# Patient Record
Sex: MALE | Race: WHITE | NOT HISPANIC OR LATINO | URBAN - METROPOLITAN AREA
[De-identification: names, ages, dates, MRNs, and addresses within clinical notes are randomized per-mention and may not be internally consistent; named-entity substitution may affect disease eponyms.]

---

## 2017-02-17 PROBLEM — Z00.00 ENCOUNTER FOR PREVENTIVE HEALTH EXAMINATION: Status: ACTIVE | Noted: 2017-02-17

## 2017-02-23 ENCOUNTER — OUTPATIENT (OUTPATIENT)
Dept: OUTPATIENT SERVICES | Facility: HOSPITAL | Age: 72
LOS: 1 days | End: 2017-02-23
Payer: MEDICARE

## 2017-02-23 ENCOUNTER — APPOINTMENT (OUTPATIENT)
Dept: MRI IMAGING | Facility: CLINIC | Age: 72
End: 2017-02-23

## 2017-02-23 DIAGNOSIS — Z00.8 ENCOUNTER FOR OTHER GENERAL EXAMINATION: ICD-10-CM

## 2017-02-23 PROCEDURE — A9585: CPT

## 2017-02-23 PROCEDURE — 70553 MRI BRAIN STEM W/O & W/DYE: CPT

## 2021-01-06 ENCOUNTER — APPOINTMENT (OUTPATIENT)
Dept: COLORECTAL SURGERY | Facility: CLINIC | Age: 76
End: 2021-01-06
Payer: MEDICARE

## 2021-01-06 ENCOUNTER — LABORATORY RESULT (OUTPATIENT)
Age: 76
End: 2021-01-06

## 2021-01-06 VITALS
SYSTOLIC BLOOD PRESSURE: 149 MMHG | DIASTOLIC BLOOD PRESSURE: 84 MMHG | HEART RATE: 56 BPM | WEIGHT: 160 LBS | TEMPERATURE: 97 F | RESPIRATION RATE: 16 BRPM | HEIGHT: 68 IN | BODY MASS INDEX: 24.25 KG/M2

## 2021-01-06 DIAGNOSIS — Z78.9 OTHER SPECIFIED HEALTH STATUS: ICD-10-CM

## 2021-01-06 PROCEDURE — 99203 OFFICE O/P NEW LOW 30 MIN: CPT

## 2021-01-06 PROCEDURE — 10081 I&D PILONIDAL CYST COMP: CPT

## 2021-01-15 PROBLEM — Z78.9 NON-SMOKER: Status: ACTIVE | Noted: 2021-01-15

## 2021-01-15 NOTE — HISTORY OF PRESENT ILLNESS
[FreeTextEntry1] : Consultation requested by Dr. Brito for pilonidal abscess. 75-year-old male has history of pilonidal cyst and abscess he is with complaints of recent abscess with pain, symptoms worsening

## 2021-01-15 NOTE — CONSULT LETTER
[Dear  ___] : Dear  [unfilled], [Consult Letter:] : I had the pleasure of evaluating your patient, [unfilled]. [Please see my note below.] : Please see my note below. [Consult Closing:] : Thank you very much for allowing me to participate in the care of this patient.  If you have any questions, please do not hesitate to contact me. [Sincerely,] : Sincerely, [FreeTextEntry3] : Clinton Do M.D. FACS, FASCRS

## 2021-01-15 NOTE — ASSESSMENT
[FreeTextEntry1] : 75-year-old male with complicated pilonidal abscess. Recommend incision and drainage oral antibiotics wound care.

## 2021-01-15 NOTE — PROCEDURE
[FreeTextEntry1] : After informed consent was obtained area was prepped and draped, incision and drainage Of pilonidal abscess was performed of abscess cultures were obtained, sterile dressings were applied the patient procedure well.

## 2021-01-15 NOTE — PHYSICAL EXAM
[Normal rectal exam] : exam was normal [None] : no anal fissures seen [Pilonidal Cyst] : a pilonidal cyst [Pilonidal Sinus] : a pilonidal sinus [Normal Breath Sounds] : Normal breath sounds [Normal Heart Sounds] : normal heart sounds [Normal Rate and Rhythm] : normal rate and rhythm [No Rash or Lesion] : No rash or lesion [Alert] : alert [Oriented to Person] : oriented to person [Oriented to Place] : oriented to place [Oriented to Time] : oriented to time [Calm] : calm [Abdomen Masses] : No abdominal masses [Tender] : nontender [JVD] : no jugular venous distention  [de-identified] : Large pilonidal abscess [de-identified] : Looks well in no distress, of stated age. [de-identified] : pupils equal reactive to light normocephalic atraumatic. [de-identified] : moves all 4 extremities appropriately with 5 over 5 strength

## 2021-01-19 ENCOUNTER — APPOINTMENT (OUTPATIENT)
Dept: COLORECTAL SURGERY | Facility: CLINIC | Age: 76
End: 2021-01-19
Payer: MEDICARE

## 2021-01-19 VITALS — WEIGHT: 160 LBS | BODY MASS INDEX: 24.25 KG/M2 | HEIGHT: 68 IN | RESPIRATION RATE: 16 BRPM | TEMPERATURE: 97.2 F

## 2021-01-19 DIAGNOSIS — Z78.9 OTHER SPECIFIED HEALTH STATUS: ICD-10-CM

## 2021-01-19 DIAGNOSIS — Z85.038 PERSONAL HISTORY OF OTHER MALIGNANT NEOPLASM OF LARGE INTESTINE: ICD-10-CM

## 2021-01-19 DIAGNOSIS — L05.01 PILONIDAL CYST WITH ABSCESS: ICD-10-CM

## 2021-01-19 DIAGNOSIS — Z86.39 PERSONAL HISTORY OF OTHER ENDOCRINE, NUTRITIONAL AND METABOLIC DISEASE: ICD-10-CM

## 2021-01-19 PROCEDURE — 99024 POSTOP FOLLOW-UP VISIT: CPT

## 2021-01-19 RX ORDER — DOXYCYCLINE 100 MG/1
100 TABLET, FILM COATED ORAL
Qty: 20 | Refills: 0 | Status: ACTIVE | COMMUNITY
Start: 2020-12-22

## 2021-01-19 RX ORDER — DONEPEZIL HYDROCHLORIDE 10 MG/1
10 TABLET ORAL
Qty: 30 | Refills: 0 | Status: ACTIVE | COMMUNITY
Start: 2020-11-23

## 2021-01-19 RX ORDER — EZETIMIBE 10 MG/1
10 TABLET ORAL
Qty: 30 | Refills: 0 | Status: ACTIVE | COMMUNITY
Start: 2020-12-08

## 2021-01-19 RX ORDER — METFORMIN ER 500 MG 500 MG/1
500 TABLET ORAL
Qty: 30 | Refills: 0 | Status: ACTIVE | COMMUNITY
Start: 2020-10-29

## 2021-01-19 RX ORDER — TAMSULOSIN HYDROCHLORIDE 0.4 MG/1
0.4 CAPSULE ORAL
Qty: 60 | Refills: 0 | Status: ACTIVE | COMMUNITY
Start: 2021-01-08

## 2021-01-19 RX ORDER — OMEPRAZOLE 20 MG/1
20 CAPSULE, DELAYED RELEASE ORAL
Qty: 60 | Refills: 0 | Status: ACTIVE | COMMUNITY
Start: 2020-12-30

## 2021-01-19 RX ORDER — METOPROLOL TARTRATE 50 MG/1
50 TABLET, FILM COATED ORAL
Qty: 60 | Refills: 0 | Status: ACTIVE | COMMUNITY
Start: 2020-04-15

## 2021-01-19 RX ORDER — ALPRAZOLAM 0.25 MG/1
0.25 TABLET ORAL
Qty: 60 | Refills: 0 | Status: ACTIVE | COMMUNITY
Start: 2021-01-06

## 2021-01-19 RX ORDER — RAMIPRIL 10 MG/1
10 CAPSULE ORAL
Qty: 30 | Refills: 0 | Status: ACTIVE | COMMUNITY
Start: 2020-04-15

## 2021-01-19 RX ORDER — HYDROCODONE BITARTRATE AND ACETAMINOPHEN 7.5; 325 MG/1; MG/1
7.5-325 TABLET ORAL
Qty: 45 | Refills: 0 | Status: ACTIVE | COMMUNITY
Start: 2020-12-29

## 2021-01-19 RX ORDER — MEMANTINE HYDROCHLORIDE 5 MG/1
5 TABLET, FILM COATED ORAL
Qty: 60 | Refills: 0 | Status: ACTIVE | COMMUNITY
Start: 2020-12-01

## 2021-01-19 RX ORDER — MIRTAZAPINE 15 MG/1
15 TABLET, FILM COATED ORAL
Qty: 15 | Refills: 0 | Status: ACTIVE | COMMUNITY
Start: 2020-12-22

## 2021-01-19 RX ORDER — CELECOXIB 200 MG/1
200 CAPSULE ORAL
Qty: 30 | Refills: 0 | Status: ACTIVE | COMMUNITY
Start: 2020-11-27

## 2021-01-19 RX ORDER — ATORVASTATIN CALCIUM 80 MG/1
80 TABLET, FILM COATED ORAL
Qty: 30 | Refills: 0 | Status: ACTIVE | COMMUNITY
Start: 2020-12-08

## 2021-01-19 RX ORDER — DUTASTERIDE 0.5 MG/1
0.5 CAPSULE, LIQUID FILLED ORAL
Qty: 30 | Refills: 0 | Status: ACTIVE | COMMUNITY
Start: 2021-01-08

## 2021-01-19 RX ORDER — DIVALPROEX SODIUM 250 MG/1
250 TABLET, EXTENDED RELEASE ORAL
Qty: 30 | Refills: 0 | Status: ACTIVE | COMMUNITY
Start: 2020-11-17

## 2021-01-19 NOTE — PHYSICAL EXAM
[Multiple Sinus Tracts] : no perianal sinus tracts [Pilonidal Cyst] : no pilonidal cysts [Pilonidal Sinus] : no pilonidal sinus [de-identified] : pilonidal abscess has resolved completely [de-identified] : Looks well in no distress, of stated age.

## 2021-02-15 ENCOUNTER — INPATIENT (INPATIENT)
Facility: HOSPITAL | Age: 76
LOS: 4 days | Discharge: HOME | DRG: 690 | End: 2021-02-20
Attending: HOSPITALIST | Admitting: STUDENT IN AN ORGANIZED HEALTH CARE EDUCATION/TRAINING PROGRAM
Payer: MEDICARE

## 2021-02-15 VITALS
HEART RATE: 131 BPM | TEMPERATURE: 98 F | RESPIRATION RATE: 18 BRPM | SYSTOLIC BLOOD PRESSURE: 179 MMHG | OXYGEN SATURATION: 97 % | DIASTOLIC BLOOD PRESSURE: 93 MMHG

## 2021-02-15 DIAGNOSIS — F02.81 DEMENTIA IN OTHER DISEASES CLASSIFIED ELSEWHERE, UNSPECIFIED SEVERITY, WITH BEHAVIORAL DISTURBANCE: ICD-10-CM

## 2021-02-15 LAB
ALBUMIN SERPL ELPH-MCNC: 3.7 G/DL — SIGNIFICANT CHANGE UP (ref 3.3–5.2)
ALP SERPL-CCNC: 88 U/L — SIGNIFICANT CHANGE UP (ref 40–120)
ALT FLD-CCNC: 65 U/L — HIGH
ANION GAP SERPL CALC-SCNC: 15 MMOL/L — SIGNIFICANT CHANGE UP (ref 5–17)
APPEARANCE UR: ABNORMAL
APTT BLD: 25.6 SEC — LOW (ref 27.5–35.5)
AST SERPL-CCNC: 41 U/L — HIGH
BACTERIA # UR AUTO: ABNORMAL
BASOPHILS # BLD AUTO: 0.03 K/UL — SIGNIFICANT CHANGE UP (ref 0–0.2)
BASOPHILS NFR BLD AUTO: 0.3 % — SIGNIFICANT CHANGE UP (ref 0–2)
BILIRUB SERPL-MCNC: 0.6 MG/DL — SIGNIFICANT CHANGE UP (ref 0.4–2)
BILIRUB UR-MCNC: NEGATIVE — SIGNIFICANT CHANGE UP
BUN SERPL-MCNC: 27 MG/DL — HIGH (ref 8–20)
CALCIUM SERPL-MCNC: 9.6 MG/DL — SIGNIFICANT CHANGE UP (ref 8.6–10.2)
CHLORIDE SERPL-SCNC: 104 MMOL/L — SIGNIFICANT CHANGE UP (ref 98–107)
CO2 SERPL-SCNC: 21 MMOL/L — LOW (ref 22–29)
COLOR SPEC: YELLOW — SIGNIFICANT CHANGE UP
CREAT SERPL-MCNC: 0.95 MG/DL — SIGNIFICANT CHANGE UP (ref 0.5–1.3)
DIFF PNL FLD: ABNORMAL
EOSINOPHIL # BLD AUTO: 0.08 K/UL — SIGNIFICANT CHANGE UP (ref 0–0.5)
EOSINOPHIL NFR BLD AUTO: 0.7 % — SIGNIFICANT CHANGE UP (ref 0–6)
EPI CELLS # UR: SIGNIFICANT CHANGE UP
GLUCOSE SERPL-MCNC: 118 MG/DL — HIGH (ref 70–99)
GLUCOSE UR QL: NEGATIVE MG/DL — SIGNIFICANT CHANGE UP
HCT VFR BLD CALC: 44 % — SIGNIFICANT CHANGE UP (ref 39–50)
HGB BLD-MCNC: 14.8 G/DL — SIGNIFICANT CHANGE UP (ref 13–17)
IMM GRANULOCYTES NFR BLD AUTO: 0.8 % — SIGNIFICANT CHANGE UP (ref 0–1.5)
INR BLD: 0.99 RATIO — SIGNIFICANT CHANGE UP (ref 0.88–1.16)
KETONES UR-MCNC: ABNORMAL
LACTATE BLDV-MCNC: 2.6 MMOL/L — HIGH (ref 0.5–2)
LEUKOCYTE ESTERASE UR-ACNC: ABNORMAL
LIDOCAIN IGE QN: 105 U/L — HIGH (ref 22–51)
LYMPHOCYTES # BLD AUTO: 25.5 % — SIGNIFICANT CHANGE UP (ref 13–44)
LYMPHOCYTES # BLD AUTO: 3.03 K/UL — SIGNIFICANT CHANGE UP (ref 1–3.3)
MCHC RBC-ENTMCNC: 29.6 PG — SIGNIFICANT CHANGE UP (ref 27–34)
MCHC RBC-ENTMCNC: 33.6 GM/DL — SIGNIFICANT CHANGE UP (ref 32–36)
MCV RBC AUTO: 88 FL — SIGNIFICANT CHANGE UP (ref 80–100)
MONOCYTES # BLD AUTO: 1.02 K/UL — HIGH (ref 0–0.9)
MONOCYTES NFR BLD AUTO: 8.6 % — SIGNIFICANT CHANGE UP (ref 2–14)
NEUTROPHILS # BLD AUTO: 7.62 K/UL — HIGH (ref 1.8–7.4)
NEUTROPHILS NFR BLD AUTO: 64.1 % — SIGNIFICANT CHANGE UP (ref 43–77)
NITRITE UR-MCNC: NEGATIVE — SIGNIFICANT CHANGE UP
PH UR: 6.5 — SIGNIFICANT CHANGE UP (ref 5–8)
PLATELET # BLD AUTO: 186 K/UL — SIGNIFICANT CHANGE UP (ref 150–400)
POTASSIUM SERPL-MCNC: 4.4 MMOL/L — SIGNIFICANT CHANGE UP (ref 3.5–5.3)
POTASSIUM SERPL-SCNC: 4.4 MMOL/L — SIGNIFICANT CHANGE UP (ref 3.5–5.3)
PROT SERPL-MCNC: 6.7 G/DL — SIGNIFICANT CHANGE UP (ref 6.6–8.7)
PROT UR-MCNC: 15 MG/DL
PROTHROM AB SERPL-ACNC: 11.5 SEC — SIGNIFICANT CHANGE UP (ref 10.6–13.6)
RBC # BLD: 5 M/UL — SIGNIFICANT CHANGE UP (ref 4.2–5.8)
RBC # FLD: 13.5 % — SIGNIFICANT CHANGE UP (ref 10.3–14.5)
RBC CASTS # UR COMP ASSIST: ABNORMAL /HPF (ref 0–4)
SARS-COV-2 RNA SPEC QL NAA+PROBE: SIGNIFICANT CHANGE UP
SODIUM SERPL-SCNC: 140 MMOL/L — SIGNIFICANT CHANGE UP (ref 135–145)
SP GR SPEC: 1.01 — SIGNIFICANT CHANGE UP (ref 1.01–1.02)
UROBILINOGEN FLD QL: NEGATIVE MG/DL — SIGNIFICANT CHANGE UP
VALPROATE SERPL-MCNC: 10.4 UG/ML — LOW (ref 50–100)
WBC # BLD: 11.88 K/UL — HIGH (ref 3.8–10.5)
WBC # FLD AUTO: 11.88 K/UL — HIGH (ref 3.8–10.5)
WBC UR QL: SIGNIFICANT CHANGE UP

## 2021-02-15 PROCEDURE — 93010 ELECTROCARDIOGRAM REPORT: CPT

## 2021-02-15 PROCEDURE — 74176 CT ABD & PELVIS W/O CONTRAST: CPT | Mod: 26

## 2021-02-15 PROCEDURE — 73502 X-RAY EXAM HIP UNI 2-3 VIEWS: CPT | Mod: 26,LT

## 2021-02-15 PROCEDURE — 71045 X-RAY EXAM CHEST 1 VIEW: CPT | Mod: 26

## 2021-02-15 RX ORDER — SODIUM CHLORIDE 9 MG/ML
1500 INJECTION INTRAMUSCULAR; INTRAVENOUS; SUBCUTANEOUS ONCE
Refills: 0 | Status: COMPLETED | OUTPATIENT
Start: 2021-02-15 | End: 2021-02-15

## 2021-02-15 RX ORDER — ATORVASTATIN CALCIUM 80 MG/1
80 TABLET, FILM COATED ORAL AT BEDTIME
Refills: 0 | Status: DISCONTINUED | OUTPATIENT
Start: 2021-02-15 | End: 2021-02-20

## 2021-02-15 RX ORDER — PANTOPRAZOLE SODIUM 20 MG/1
40 TABLET, DELAYED RELEASE ORAL
Refills: 0 | Status: DISCONTINUED | OUTPATIENT
Start: 2021-02-15 | End: 2021-02-20

## 2021-02-15 RX ORDER — CEFPODOXIME PROXETIL 100 MG
1 TABLET ORAL
Qty: 20 | Refills: 0
Start: 2021-02-15 | End: 2021-02-24

## 2021-02-15 RX ORDER — METFORMIN HYDROCHLORIDE 850 MG/1
500 TABLET ORAL ONCE
Refills: 0 | Status: COMPLETED | OUTPATIENT
Start: 2021-02-15 | End: 2021-02-15

## 2021-02-15 RX ORDER — ACETAMINOPHEN 500 MG
650 TABLET ORAL EVERY 6 HOURS
Refills: 0 | Status: DISCONTINUED | OUTPATIENT
Start: 2021-02-15 | End: 2021-02-20

## 2021-02-15 RX ORDER — QUETIAPINE FUMARATE 200 MG/1
12.5 TABLET, FILM COATED ORAL AT BEDTIME
Refills: 0 | Status: DISCONTINUED | OUTPATIENT
Start: 2021-02-15 | End: 2021-02-20

## 2021-02-15 RX ORDER — TAMSULOSIN HYDROCHLORIDE 0.4 MG/1
0.4 CAPSULE ORAL AT BEDTIME
Refills: 0 | Status: DISCONTINUED | OUTPATIENT
Start: 2021-02-15 | End: 2021-02-20

## 2021-02-15 RX ORDER — QUETIAPINE FUMARATE 200 MG/1
12.5 TABLET, FILM COATED ORAL DAILY
Refills: 0 | Status: DISCONTINUED | OUTPATIENT
Start: 2021-02-15 | End: 2021-02-15

## 2021-02-15 RX ORDER — MEMANTINE HYDROCHLORIDE 10 MG/1
10 TABLET ORAL
Refills: 0 | Status: DISCONTINUED | OUTPATIENT
Start: 2021-02-15 | End: 2021-02-20

## 2021-02-15 RX ORDER — MEMANTINE HYDROCHLORIDE 10 MG/1
5 TABLET ORAL
Refills: 0 | Status: DISCONTINUED | OUTPATIENT
Start: 2021-02-15 | End: 2021-02-15

## 2021-02-15 RX ORDER — DONEPEZIL HYDROCHLORIDE 10 MG/1
10 TABLET, FILM COATED ORAL AT BEDTIME
Refills: 0 | Status: DISCONTINUED | OUTPATIENT
Start: 2021-02-15 | End: 2021-02-20

## 2021-02-15 RX ORDER — METOPROLOL TARTRATE 50 MG
50 TABLET ORAL DAILY
Refills: 0 | Status: DISCONTINUED | OUTPATIENT
Start: 2021-02-15 | End: 2021-02-20

## 2021-02-15 RX ORDER — CEFPODOXIME PROXETIL 100 MG
100 TABLET ORAL EVERY 12 HOURS
Refills: 0 | Status: DISCONTINUED | OUTPATIENT
Start: 2021-02-15 | End: 2021-02-16

## 2021-02-15 RX ORDER — FINASTERIDE 5 MG/1
5 TABLET, FILM COATED ORAL DAILY
Refills: 0 | Status: DISCONTINUED | OUTPATIENT
Start: 2021-02-15 | End: 2021-02-20

## 2021-02-15 RX ORDER — KETOROLAC TROMETHAMINE 30 MG/ML
15 SYRINGE (ML) INJECTION ONCE
Refills: 0 | Status: DISCONTINUED | OUTPATIENT
Start: 2021-02-15 | End: 2021-02-15

## 2021-02-15 RX ORDER — CEFTRIAXONE 500 MG/1
1000 INJECTION, POWDER, FOR SOLUTION INTRAMUSCULAR; INTRAVENOUS ONCE
Refills: 0 | Status: COMPLETED | OUTPATIENT
Start: 2021-02-15 | End: 2021-02-15

## 2021-02-15 RX ORDER — TAMSULOSIN HYDROCHLORIDE 0.4 MG/1
0.4 CAPSULE ORAL ONCE
Refills: 0 | Status: COMPLETED | OUTPATIENT
Start: 2021-02-15 | End: 2021-02-15

## 2021-02-15 RX ORDER — SODIUM CHLORIDE 9 MG/ML
1000 INJECTION INTRAMUSCULAR; INTRAVENOUS; SUBCUTANEOUS ONCE
Refills: 0 | Status: COMPLETED | OUTPATIENT
Start: 2021-02-15 | End: 2021-02-15

## 2021-02-15 RX ORDER — MORPHINE SULFATE 50 MG/1
4 CAPSULE, EXTENDED RELEASE ORAL ONCE
Refills: 0 | Status: DISCONTINUED | OUTPATIENT
Start: 2021-02-15 | End: 2021-02-15

## 2021-02-15 RX ORDER — ESCITALOPRAM OXALATE 10 MG/1
5 TABLET, FILM COATED ORAL DAILY
Refills: 0 | Status: DISCONTINUED | OUTPATIENT
Start: 2021-02-15 | End: 2021-02-20

## 2021-02-15 RX ADMIN — TAMSULOSIN HYDROCHLORIDE 0.4 MILLIGRAM(S): 0.4 CAPSULE ORAL at 09:34

## 2021-02-15 RX ADMIN — CEFTRIAXONE 1000 MILLIGRAM(S): 500 INJECTION, POWDER, FOR SOLUTION INTRAMUSCULAR; INTRAVENOUS at 08:40

## 2021-02-15 RX ADMIN — SODIUM CHLORIDE 1500 MILLILITER(S): 9 INJECTION INTRAMUSCULAR; INTRAVENOUS; SUBCUTANEOUS at 06:15

## 2021-02-15 RX ADMIN — QUETIAPINE FUMARATE 12.5 MILLIGRAM(S): 200 TABLET, FILM COATED ORAL at 20:53

## 2021-02-15 RX ADMIN — ATORVASTATIN CALCIUM 80 MILLIGRAM(S): 80 TABLET, FILM COATED ORAL at 20:48

## 2021-02-15 RX ADMIN — METFORMIN HYDROCHLORIDE 500 MILLIGRAM(S): 850 TABLET ORAL at 20:47

## 2021-02-15 RX ADMIN — DONEPEZIL HYDROCHLORIDE 10 MILLIGRAM(S): 10 TABLET, FILM COATED ORAL at 20:48

## 2021-02-15 RX ADMIN — MORPHINE SULFATE 4 MILLIGRAM(S): 50 CAPSULE, EXTENDED RELEASE ORAL at 04:34

## 2021-02-15 RX ADMIN — SODIUM CHLORIDE 1000 MILLILITER(S): 9 INJECTION INTRAMUSCULAR; INTRAVENOUS; SUBCUTANEOUS at 04:34

## 2021-02-15 RX ADMIN — MEMANTINE HYDROCHLORIDE 10 MILLIGRAM(S): 10 TABLET ORAL at 20:49

## 2021-02-15 RX ADMIN — CEFTRIAXONE 100 MILLIGRAM(S): 500 INJECTION, POWDER, FOR SOLUTION INTRAMUSCULAR; INTRAVENOUS at 05:14

## 2021-02-15 RX ADMIN — Medication 15 MILLIGRAM(S): at 06:18

## 2021-02-15 RX ADMIN — ESCITALOPRAM OXALATE 5 MILLIGRAM(S): 10 TABLET, FILM COATED ORAL at 20:49

## 2021-02-15 RX ADMIN — SODIUM CHLORIDE 1500 MILLILITER(S): 9 INJECTION INTRAMUSCULAR; INTRAVENOUS; SUBCUTANEOUS at 08:41

## 2021-02-15 NOTE — ED PROVIDER NOTE - PROGRESS NOTE DETAILS
Patient agitation has improved. Patient agitation has improved and resting comfortably at this time. UA is as noted. Will obtain CT. however, patient has significant arthritis of the hip which could have caused his condition. Wife admits to pt not taking his meds as prescribed at times- only receiving Xanax HS.  Pt has a Neurologist he sees as outpt and she requesting Neuro revaluation which is not indicated at this time.  SW/Case management called CT results and UA as noted.  case d/w/ Urology/Dr. Falcon and recommending Flomax (pt already prescribed) and Vantin with outpt f/u Pt seen by psychiatry for medication management of pt's behavior. Will place on OBS and re-eval pt in AM after medications are changed

## 2021-02-15 NOTE — ED BEHAVIORAL HEALTH ASSESSMENT NOTE - SUMMARY
Patient a 76 y/o  male, domiciled with wife, no past Psychiatric hx; no prior SI/SA; no drug abuse hx, medically has Dementia, with BPH; DM; HLD; HTN; Arthritis was BIB/EMS activated by wife due to agitation.    Patient in a stretcher, on 1:1, alert with no orientation and not able to have any reasonable conversation, endorses that he has children with G[employer] Children, but does not remember how many child he has. Collateral Info obtained from his wife Nhi @ [phone] who informed that at 3 AM he had an episode and started to shout, throwing things and was cursing. As per wife, she is unwilling to take him back at this time as she feels that she is not able to manage him in this way. He has poor sleep, up at night most of the time, but able to dress himself, able to walk unassisted, and also able to eat by himself. She added that he has Kidney stone, and she feels that with UTI/Kidney Stone people can get upset/agitated and need help in that level for stability. She wants him to get stable before she decides to take him back. They have no HHA or any other assistance at home. No prior SI/SA, no perceptual experiences noted, but seems to have disorganization especially with Memory/and day-to-day events and night time aggravation. His wife unsure how long he has the Dementia, but believes that it's more than 1-2 years.    Patient is on multiple meds at this time, may need meds adjustment for stability, he is under care of  Neurologist Dr. Russell who prescribes him Depakote 250 mg daily, with Namenda 5 mg BID etc. He needs meds adjustment --which includes, Discontinue Depakote and Remeron, add Lexapro 5 mg with titration to Lexapro 10 mg daily. To increase Namenda 5 mg BID to Namenda 10 mg BID and to add Seroquel 12.5 mg HS. patient may need an admission for stability/safety.

## 2021-02-15 NOTE — ED PROVIDER NOTE - CARE PLAN
Principal Discharge DX:	Renal colic  Secondary Diagnosis:	Dementia in other conditions, with behavioral disturbance

## 2021-02-15 NOTE — PHYSICAL THERAPY INITIAL EVALUATION ADULT - ADDITIONAL COMMENTS
Pt. is a poor historian. Reporting he lives with his wife in a house with stairs, but unable to report how many. Pt. reprots use of RW PTA and is unable to report any other DME.

## 2021-02-15 NOTE — ED BEHAVIORAL HEALTH ASSESSMENT NOTE - NSBHROSSYSTEMS_PSY_ALL_CORE
Cardiovascular.../Gastrointestinal.../Genitourinary.../Musculoskeletal.../Neurological.../Endocrine...

## 2021-02-15 NOTE — CONSULT NOTE ADULT - SUBJECTIVE AND OBJECTIVE BOX
HPI:  · HPI Objective Statement: 74 yo male with hx of significant dementia and osteoarthritis of the hips presents for evaluation of acute and significant atraumatic left hip pain prompting his wife to call 911. EMS team states when they arrived the patient was sitting on the floor complaining of pain. His wife indicated to them that he can "sundown," but his current agitation was more then normal and she has never seen it to this level.    Left abd and flank pain was 10/10 in severity.  Pain was colicky. The pain is currently resolved.      PAST MEDICAL & SURGICAL HISTORY:  Diabetes    Arthritis    Dementia        REVIEW OF SYSTEMS:    Constitutional: No fever, weight loss or fatigue  Eyes: No eye pain, visual disturbances, or discharge  ENMT:  No difficulty hearing, tinnitus, vertigo; No sinus or throat pain  Respiratory: No cough, wheezing, chills or hemoptysis  Cardiovascular: No chest pain, palpitations, shortness of breath, dizziness or leg swelling  Gastrointestinal: No abdominal or epigastric pain. No nausea, vomiting or hematemesis; No diarrhea or constipation. No melena or hematochezia.  Genitourinary: No dysuria, frequency, hematuria or incontinence  Rectal: No pain, hemorrhoids or incontinence  Neurological: Pleasant dementia, cooperative  Skin: No itching, burning, rashes or lesions   Lymph Nodes: No enlarged glands  Musculoskeletal: No joint pain or swelling; No muscle, back or extremity pain  Psychiatric: AD  Heme/Lymph: No easy bruising or bleeding gums  Allergy and Immunologic: No hives or eczema    MEDICATIONS  (STANDING):    MEDICATIONS  (PRN):      Allergies    No Known Allergies    Intolerances        SOCIAL HISTORY:    FAMILY HISTORY:      Vital Signs Last 24 Hrs  T(C): 36.7 (15 Feb 2021 10:54), Max: 37.2 (15 Feb 2021 05:10)  T(F): 98 (15 Feb 2021 10:54), Max: 99 (15 Feb 2021 05:10)  HR: 59 (15 Feb 2021 10:54) (59 - 131)  BP: 190/79 (15 Feb 2021 10:54) (144/65 - 190/79)  BP(mean): --  RR: 20 (15 Feb 2021 10:54) (18 - 20)  SpO2: 98% (15 Feb 2021 10:54) (97% - 99%)    PHYSICAL EXAM:    General: Well developed; well nourished; in no acute distress  Head: Normocephalic; atraumatic  Respiratory: No wheezes, rales or rhonchi  Cardiovascular: Regular rate and rhythm. S1 and S2 Normal; No murmurs, gallops or rubs  Gastrointestinal: Soft non-tender non-distended; Normal bowel sounds; No hepatosplenomegaly  Genitourinary: No costovertebral angle tenderness.  Urinary bladder is clinically not distended  Extremities: Normal range of motion, No clubbing, cyanosis or edema  Vascular: Peripheral pulses palpable 2+ bilaterally  Neurological: Alert and oriented x4  Skin: Warm and dry. No acute rash  Musculoskeletal: Normal gait, tone, without deformities  Psychiatric: Cooperative and appropriate      LABS:                        14.8   11.88 )-----------( 186      ( 15 Feb 2021 04:50 )             44.0     02-15    140  |  104  |  27.0<H>  ----------------------------<  118<H>  4.4   |  21.0<L>  |  0.95    Ca    9.6      15 Feb 2021 04:50    TPro  6.7  /  Alb  3.7  /  TBili  0.6  /  DBili  x   /  AST  41<H>  /  ALT  65<H>  /  AlkPhos  88  02-15    PT/INR - ( 15 Feb 2021 05:50 )   PT: 11.5 sec;   INR: 0.99 ratio         PTT - ( 15 Feb 2021 05:50 )  PTT:25.6 sec  Urinalysis Basic - ( 15 Feb 2021 05:50 )    Color: Yellow / Appearance: Slightly Turbid / S.015 / pH: x  Gluc: x / Ketone: Trace  / Bili: Negative / Urobili: Negative mg/dL   Blood: x / Protein: 15 mg/dL / Nitrite: Negative   Leuk Esterase: Small / RBC: 11-25 /HPF / WBC 3-5   Sq Epi: x / Non Sq Epi: Occasional / Bacteria: Occasional        RADIOLOGY & ADDITIONAL STUDIES:    IMPRESSION:  Moderate left hydroureteronephrosis and left perinephric fat stranding and fluid secondary to a 5 mm calculus in the distal left ureter.    Bilateral nonobstructing renal calculi measuring up to 5 mm.    Severe degenerative changes in the hips, greater on the left.    Calcifications in the spinal canal in the lower lumbar spine and sacrum, likely dural, possibly metabolic; a nonemergent MRI of the lumbar spine and sacrum is recommended.

## 2021-02-15 NOTE — ED ADULT NURSE REASSESSMENT NOTE - INTERVENTIONS DEFINITIONS
[place] to call system/Physically safe environment: no spills, clutter or unnecessary equipment/Stretcher in lowest position, wheels locked, appropriate side rails in place/Monitor gait and stability/Monitor for mental status changes and reorient to person, place, and time/Reinforce activity limits and safety measures with patient and family

## 2021-02-15 NOTE — ED BEHAVIORAL HEALTH ASSESSMENT NOTE - RISK ASSESSMENT
Low Acute Suicide Risk Low-No prior SI/SA; NO active drug abuse hx  Protective Factors--Family Supportive  Mitigating Factors--In treatment now

## 2021-02-15 NOTE — ED ADULT TRIAGE NOTE - PRO INTERPRETER NEED 2
DISCHARGE SUMMARY  This is a  female born on 2023 at a gestational age of Gestational Age: 39w6d. Infant remains hospitalized for: routine  care     Information:       Birth Weight: 8 lb 10.6 oz (3.93 kg)       Birth Length: 1' 8.5\" (0.521 m)   Birth Head Circumference: 37 cm (14.57\")   Discharge Weight - Scale: 8 lb 1 oz (3.657 kg)  Percent Weight Change Since Birth: -6.95%   Delivery Method: , Low Transverse  APGAR One: 9  APGAR Five: 9  APGAR Ten: N/A              Feeding Method Used:  Bottle, Breastfeeding    Recent Labs:   Admission on 2021   Component Date Value Ref Range Status    Sample Type 2021 Cord-Arterial   Final    POC pH 20214   Final    POC pCO2 2021  mmHg Final    POC PO2 2021  mmHg Final    POC HCO3 2021  mmol/L Final    POC Base Excess 2021 -2.3  mmol/L Final    POC O2 SAT 2021  % Final    POC CPB 2021 No   Final    POC  ID 2021 [id number]   Final    POC Device ID 2021 [id number]   Final    Sample Type 2021 Cord-Venous   Final    POC pH 20210   Final    POC pCO2 2021  mmHg Final    POC PO2 2021  mmHg Final    POC HCO3 2021  mmol/L Final    POC Base Excess 2021 -2.0  mmol/L Final    POC O2 SAT 2021  % Final    POC CPB 2021 No   Final    POC  ID 2021 [id number]   Final    POC Device ID 2021 [id number]   Final    Meter Glucose 2021 61* 70 - 110 mg/dL Final    Meter Glucose 2021 50* 70 - 110 mg/dL Final    Meter Glucose 2021 48* 70 - 110 mg/dL Final    Meter Glucose 2021 58* 70 - 110 mg/dL Final    Total Bilirubin 2021* 2.0 - 6.0 mg/dL Final    Bilirubin, Direct 2021* 0.0 - 0.3 mg/dL Final    Bilirubin, Indirect 2021  0.6 - 10.5 mg/dL Final    Total Bilirubin 2021* 6.0 - 8.0 mg/dL Final    Total Bilirubin 2021 11.0* 6.0 - 8.0 mg/dL Final    Total Bilirubin 2021 11.9* 6.0 - 8.0 mg/dL Final    Total Bilirubin 2021 12.3* 4.0 - 12.0 mg/dL Final      Immunization History   Administered Date(s) Administered    Hepatitis B Ped/Adol (Engerix-B, Recombivax HB) 2021       Maternal Labs: Information for the patient's mother:  Amos Pittman [[id number]]     HIV-1/HIV-2 Ab   Date Value Ref Range Status   2021 Non-Reactive NON REACT Final      Group B Strep: negative  Maternal Blood Type: Information for the patient's mother:  Amos Pittman [[id number]]   A POS    Baby Blood Type: not indicated   No results for input(s): [facility] [address] in the last 72 hours. TSB: 12.3 at 74h of life. Low intermediate risk. Infant had received ~18h of phototherapy due to high risk bili at 24h (11). Hearing Screen Result: Screening 1 Results: Right Ear Refer, Left Ear Pass  Car seat study:  NA    Oximeter: @LASTSAO2(3)@   CCHD: O2 sat of right hand Pulse Ox Saturation of Right Hand: 99 %  CCHD: O2 sat of foot : Pulse Ox Saturation of Foot: 100 %  CCHD screening result: Screening  Result: Pass    DISCHARGE EXAMINATION:   Vital Signs:  BP 63/32   Pulse 120   Temp 98.7 °F (37.1 °C)   Resp 44   Ht 20.5\" (52.1 cm) Comment: Filed from Delivery Summary  Wt 8 lb 1 oz (3.657 kg)   HC 37 cm (14.57\") Comment: Filed from Delivery Summary  BMI 13.49 kg/m²       General Appearance:  Healthy-appearing, vigorous infant, strong cry.   Skin: warm, dry, normal color, no rashes                             Head:  Sutures mobile, fontanelles normal size  Eyes:  Sclerae white, pupils equal and reactive, red reflex normal  bilaterally                                    Ears:  Well-positioned, well-formed pinnae                         Nose:  Clear, normal mucosa  Throat:  Lips, tongue and mucosa are pink, moist and intact; palate intact  Neck:  Supple, symmetrical  Chest:  Lungs clear to auscultation, respirations unlabored   Heart:  Regular rate & rhythm, S1 S2, no murmurs, rubs, or gallops  Abdomen:  Soft, non-tender, no masses; umbilical stump clean and dry  Umbilicus:   3 vessel cord  Pulses:  Strong equal femoral pulses, brisk capillary refill  Hips:  Negative Chamberlain, Ortolani, gluteal creases equal  :  Normal genitalia; Extremities:  Well-perfused, warm and dry  Neuro:  Easily aroused; good symmetric tone and strength; positive root and suck; symmetric normal reflexes                                       Assessment:  female infant born at a gestational age of Gestational Age: 39w6d. Gestational Age: appropriate for gestational age  Gestation: full term  Maternal GBS: neg  Delivery Route: Delivery Method: , Low Transverse   Patient Active Problem List   Diagnosis    Normal  (single liveborn)   [address] [place] [address] Term  delivered by  section, current hospitalization    LGA (large for gestational age) infant     Principal diagnosis: Term  delivered by  section, current hospitalization   Patient condition: good  OTHER: mom's milk in as of today, baby is now having yellow seedy stools. Plan: 1. Discharge home in stable condition with parent(s)/ legal guardian  2. Follow up with PCP: [facility] tomorrow with me. I will order follow up bili at that time as well. Call for appointment. 3. Discharge instructions reviewed with family.         Electronically signed by Nighat Llamas MD on 2021 at 10:33 AM English

## 2021-02-15 NOTE — PROVIDER CONTACT NOTE (OTHER) - BACKGROUND
details. 1:1 present throughout. Pre and post session pain 0/10. Pt. left on stretcher in NAD with 1:1 and all needs in reach. PT will not follow.

## 2021-02-15 NOTE — ED CDU PROVIDER INITIAL DAY NOTE - MEDICAL DECISION MAKING DETAILS
Pt with renal stone and pt's wife refusing to take him home secondary to increasing agitation and disruptive behavior.  Pt seen and evaluated by psychiatry and medication changes and recommendations made. Will observe and re-eval pt after he receives meds

## 2021-02-15 NOTE — CONSULT NOTE ADULT - ASSESSMENT
Ureteral colick    Currently no pain  1.  flomax x 21 days  2.  vantin x 10 days bid  3.  No indication for stenting currently  4.  FU in  office in two weeks  5.  If he comes back to ED will extract his stone  6.  He needs ibuprofen and percocet for home

## 2021-02-15 NOTE — CHART NOTE - NSCHARTNOTEFT_GEN_A_CORE
As per MD patient is medically stable. Patient is able to ambulate independently. As per MD patient has dementia and patient's wife, Nhi Martines ([phone]), has reported the patient has been difficult at home. SW placed call to patient's wife to discuss discharge plan and safety at home. Patient's wife reported the patient has thrown clothes in their home and has been cursing. SHANNON asked Nhi if patient has been violent- Nhi reported patient does not have history of violence. SHANNON asked if patient has been physically aggressive- Nhi reported the patient has not been physically aggressive. SW asked if Nhi would like to place police report involving any incident that may have happened in the home- Nhi declined. Nhi reported she believes patient was agitated prior to coming to the hospital because he has a kidney stone and requested for patient to stay in the hospital for longer. SW explained patient is medically stable and ready to return home. Patient's wife then requested for neurology consult. SHANNON explained the patient is medically ready and MD explained to Nhi patient does not need neurology consult. Patient is already being followed by neurologist outpatient. SW asked patient's wife if she is considering long term placement for the patient- Patient's wife reported she does not want long term placement. SHANNON asked if Nhi would want patient to have aides in place at home. Nhi reported she does not want aides to come to their home. Nhi continues to decline all assistance from SW (transportation to return to residence, LT placement, and assistance with nursing aides). Psychiatry consult is placed- SW will continue to follow for psychiatry recommendations.

## 2021-02-15 NOTE — ED CDU PROVIDER INITIAL DAY NOTE - OBJECTIVE STATEMENT
74 yo male with hx of significant dementia and osteoarthritis of the hips presents for evaluation of acute and significant atraumatic left hip pain prompting his wife to call 911. EMS team states when they arrived the patient was sitting on the floor complaining of pain. His wife indicated to them that he can "sundown," but his current agitation was more then normal and she has never seen it to this level.

## 2021-02-15 NOTE — PHYSICAL THERAPY INITIAL EVALUATION ADULT - PERTINENT HX OF CURRENT PROBLEM, REHAB EVAL
74 yo male with hx of significant dementia and osteoarthritis of the hips presents for evaluation of acute and significant atraumatic left hip pain prompting his wife to call 911.

## 2021-02-15 NOTE — ED ADULT TRIAGE NOTE - CHIEF COMPLAINT QUOTE
patient from home a&o x1  history of alzheimers and dimentia, with complaints of left hip pain, as per ems wife denies any falls, patient uncooperative during triage not sitting still and cooperating with staff.

## 2021-02-15 NOTE — PHYSICAL THERAPY INITIAL EVALUATION ADULT - PHYSICAL ASSIST/NONPHYSICAL ASSIST: GAIT, REHAB EVAL
verbal cues for safety due to confusion and decreased attention to task, often forgetting what he is in the middle of doing/supervision

## 2021-02-15 NOTE — PROVIDER CONTACT NOTE (OTHER) - SITUATION
Chart reviewed, contents noted. MD order for PT eval received. PT spoke with  Dr Juarez re: X ray results (not in chart). MD reporting x ray negative. Initial evaluation completed - see chart for

## 2021-02-15 NOTE — ED PROVIDER NOTE - OBJECTIVE STATEMENT
76 yo male with hx of significant dementia and osteoarthritis of the hips presents for evaluation of acute and significant atraumatic left hip pain prompting his wife to call 911. EMS team states when they arrived the patient was sitting on the floor complaining of pain. His wife indicated to them that he can "sundown," but his current agitation was more then normal and she has never seen it to this level.

## 2021-02-15 NOTE — ED CDU PROVIDER INITIAL DAY NOTE - PROGRESS NOTE DETAILS
Wife admits to pt not taking his meds as prescribed at times- only receiving Xanax HS.  Pt has a Neurologist he sees as outpt and she requesting Neuro revaluation which is not indicated at this time.  SW/Case management called UA is as noted. Will obtain CT. however, patient has significant arthritis of the hip which could have caused his condition. CT results and UA as noted.  case d/w/ Urology/Dr. Falcon and recommending Flomax (pt already prescribed) and Vantin with outpt f/u

## 2021-02-15 NOTE — PROVIDER CONTACT NOTE (OTHER) - ASSESSMENT
Pt. has no PT needs; 24/7 supervision recommended for safety due to cognition. As per chart, pt. lives with wife.

## 2021-02-15 NOTE — ED BEHAVIORAL HEALTH ASSESSMENT NOTE - DETAILS
As Above HTN DM BPH with Kidney Stone Ed attending aware GERD Dementia ED Attending aware None Arthritis

## 2021-02-15 NOTE — ED ADULT NURSE NOTE - NSIMPLEMENTINTERV_GEN_ALL_ED
Implemented All Universal Safety Interventions:  [place] to call system. Call bell, personal items and telephone within reach. Instruct patient to call for assistance. Room bathroom lighting operational. Non-slip footwear when patient is off stretcher. Physically safe environment: no spills, clutter or unnecessary equipment. Stretcher in lowest position, wheels locked, appropriate side rails in place.

## 2021-02-15 NOTE — ED PROVIDER NOTE - CLINICAL SUMMARY MEDICAL DECISION MAKING FREE TEXT BOX
Pt with 5 mm renal stone.  Seen by Urology/Dr. Falcon and recommending Flomax which pt is already on and Vantin.  Wife refusing to take pt home secondarily to pt's disruptive behavior and occ agitation.  Pt seen by psych and medications adjusted.  will place on virtual OBS and re-eval

## 2021-02-16 DIAGNOSIS — R41.0 DISORIENTATION, UNSPECIFIED: ICD-10-CM

## 2021-02-16 LAB
AMMONIA BLD-MCNC: 15 UMOL/L — SIGNIFICANT CHANGE UP (ref 11–55)
FOLATE SERPL-MCNC: >20 NG/ML — SIGNIFICANT CHANGE UP
TSH SERPL-MCNC: 3.82 UIU/ML — SIGNIFICANT CHANGE UP (ref 0.27–4.2)
VIT B12 SERPL-MCNC: >2000 PG/ML — HIGH (ref 232–1245)

## 2021-02-16 PROCEDURE — 99223 1ST HOSP IP/OBS HIGH 75: CPT

## 2021-02-16 RX ORDER — CEFEPIME 1 G/1
INJECTION, POWDER, FOR SOLUTION INTRAMUSCULAR; INTRAVENOUS
Refills: 0 | Status: DISCONTINUED | OUTPATIENT
Start: 2021-02-16 | End: 2021-02-18

## 2021-02-16 RX ORDER — ENOXAPARIN SODIUM 100 MG/ML
40 INJECTION SUBCUTANEOUS DAILY
Refills: 0 | Status: DISCONTINUED | OUTPATIENT
Start: 2021-02-16 | End: 2021-02-20

## 2021-02-16 RX ORDER — CEFEPIME 1 G/1
1000 INJECTION, POWDER, FOR SOLUTION INTRAMUSCULAR; INTRAVENOUS EVERY 8 HOURS
Refills: 0 | Status: DISCONTINUED | OUTPATIENT
Start: 2021-02-16 | End: 2021-02-18

## 2021-02-16 RX ORDER — HALOPERIDOL DECANOATE 100 MG/ML
5 INJECTION INTRAMUSCULAR ONCE
Refills: 0 | Status: COMPLETED | OUTPATIENT
Start: 2021-02-16 | End: 2021-02-16

## 2021-02-16 RX ORDER — CEFEPIME 1 G/1
1000 INJECTION, POWDER, FOR SOLUTION INTRAMUSCULAR; INTRAVENOUS ONCE
Refills: 0 | Status: COMPLETED | OUTPATIENT
Start: 2021-02-16 | End: 2021-02-16

## 2021-02-16 RX ORDER — HALOPERIDOL DECANOATE 100 MG/ML
5 INJECTION INTRAMUSCULAR ONCE
Refills: 0 | Status: DISCONTINUED | OUTPATIENT
Start: 2021-02-16 | End: 2021-02-16

## 2021-02-16 RX ADMIN — Medication 1 MILLIGRAM(S): at 02:20

## 2021-02-16 RX ADMIN — MEMANTINE HYDROCHLORIDE 10 MILLIGRAM(S): 10 TABLET ORAL at 07:11

## 2021-02-16 RX ADMIN — Medication 50 MILLIGRAM(S): at 07:09

## 2021-02-16 RX ADMIN — Medication 2 MILLIGRAM(S): at 17:23

## 2021-02-16 RX ADMIN — FINASTERIDE 5 MILLIGRAM(S): 5 TABLET, FILM COATED ORAL at 12:02

## 2021-02-16 RX ADMIN — ENOXAPARIN SODIUM 40 MILLIGRAM(S): 100 INJECTION SUBCUTANEOUS at 17:47

## 2021-02-16 RX ADMIN — HALOPERIDOL DECANOATE 5 MILLIGRAM(S): 100 INJECTION INTRAMUSCULAR at 05:09

## 2021-02-16 RX ADMIN — HALOPERIDOL DECANOATE 5 MILLIGRAM(S): 100 INJECTION INTRAMUSCULAR at 14:25

## 2021-02-16 RX ADMIN — Medication 100 MILLIGRAM(S): at 07:09

## 2021-02-16 RX ADMIN — ESCITALOPRAM OXALATE 5 MILLIGRAM(S): 10 TABLET, FILM COATED ORAL at 12:02

## 2021-02-16 RX ADMIN — Medication 2 MILLIGRAM(S): at 05:01

## 2021-02-16 RX ADMIN — Medication 1 MILLIGRAM(S): at 02:37

## 2021-02-16 RX ADMIN — Medication 2 MILLIGRAM(S): at 14:50

## 2021-02-16 RX ADMIN — CEFEPIME 100 MILLIGRAM(S): 1 INJECTION, POWDER, FOR SOLUTION INTRAMUSCULAR; INTRAVENOUS at 17:36

## 2021-02-16 RX ADMIN — CEFEPIME 100 MILLIGRAM(S): 1 INJECTION, POWDER, FOR SOLUTION INTRAMUSCULAR; INTRAVENOUS at 21:53

## 2021-02-16 NOTE — ED CDU PROVIDER SUBSEQUENT DAY NOTE - HISTORY
pt becoming agitated, getting out of bed, screaming, attempted verbal escalation and companionship with no avail requiring medical management of agitation

## 2021-02-16 NOTE — H&P ADULT - HISTORY OF PRESENT ILLNESS
Information obtained from EMR    74 y/o with PMH of Dementia, PH, DM, HLD, HTN, Arthritis was BIB/EMS activated by wife for evaluation of acute and significant atraumatic left hip pain prompting his wife to call 911. EMS team states when they arrived the patient was sitting on the floor complaining of pain. His wife indicated to them that he can "sundown," but his current agitation was more then normal and she has never seen it to this level. CT abd pelvis showed Moderate left hydroureteronephrosis and left perinephric fat stranding and fluid secondary to a 5 mm calculus in the distal left ureter. Bilateral nonobstructing renal calculi measuring up to 5 mm. No acute surgical intervention as per urology, was started on flomax and vantin. Patient was admitted under observation status, monitored in the ED. Psych was consulted, his medications were adjusted but he had episodes of sundowning again, received haldol in ED. Evaluated by psych again today, they spoke to his wife who said that patient is not at his baseline and is difficult to handle in this agitated state. Psych recommended medicine admission to evaluated cause of delirium.     In the ED, VS- T 98.1, HR 83, /85, RR 18, SaO2 95%. Labs remarkable for WBC 11.8, lactate 2.6. UA positive.      Information obtained from EMR, patient unable to give history due to poor mental status    74 y/o with PMH of Dementia, PH, DM, HLD, HTN, Arthritis was BIB/EMS activated by wife for evaluation of acute and significant atraumatic left hip pain prompting his wife to call 911. EMS team states when they arrived the patient was sitting on the floor complaining of pain. His wife indicated to them that he can "sundown," but his current agitation was more then normal and she has never seen it to this level. CT abd pelvis showed Moderate left hydroureteronephrosis and left perinephric fat stranding and fluid secondary to a 5 mm calculus in the distal left ureter. Bilateral nonobstructing renal calculi measuring up to 5 mm. No acute surgical intervention as per urology, was started on flomax and vantin. Patient was admitted under observation status, monitored in the ED. Psych was consulted, his medications were adjusted but he had episodes of sundowning again, received haldol in ED. Evaluated by psych again today, they spoke to his wife who said that patient is not at his baseline and is difficult to handle in this agitated state. Psych recommended medicine admission to evaluated cause of delirium.     In the ED, VS- T 98.1, HR 83, /85, RR 18, SaO2 95%. Labs remarkable for WBC 11.8, lactate 2.6. UA positive.

## 2021-02-16 NOTE — H&P ADULT - NSHPPHYSICALEXAM_GEN_ALL_CORE
Vital Signs Last 24 Hrs  T(F): 98.1 (16 Feb 2021 11:16), Max: 99 (16 Feb 2021 08:49)  HR: 83 (16 Feb 2021 11:16) (71 - 86)  BP: 155/85 (16 Feb 2021 11:16) (130/84 - 176/90)  RR: 18 (16 Feb 2021 11:16) (18 - 20)  SpO2: 95% (16 Feb 2021 11:16) (95% - 99%)    Physical Exam:  Constitutional: NAD, awake and alert, well-developed  Neck: Soft and supple, No LAD, No JVD  Respiratory: cta b/l no wheezing no rhonchi  Cardiovascular: +s1/s2 no edema b/l le  Gastrointestinal: soft nt nd bs+  Vascular: 2+ peripheral pulses  Neurological: A/O x 3, no focal deficits  Musculoskeletal: 5/5 strength b/l upper and lower extremities  : Contraindicated  Breasts: Contraindicated  Rectal: Contraindicated Vital Signs Last 24 Hrs  T(F): 98.1 (16 Feb 2021 11:16), Max: 99 (16 Feb 2021 08:49)  HR: 83 (16 Feb 2021 11:16) (71 - 86)  BP: 155/85 (16 Feb 2021 11:16) (130/84 - 176/90)  RR: 18 (16 Feb 2021 11:16) (18 - 20)  SpO2: 95% (16 Feb 2021 11:16) (95% - 99%)    Physical Exam:  Constitutional: NAD, awake, agitated  Neck: Soft and supple, No LAD, No JVD  Respiratory: cta b/l no wheezing no rhonchi  Cardiovascular: +s1/s2 no edema b/l le  Gastrointestinal: soft nt nd bs+  Vascular: 2+ peripheral pulses  Neurological: A/O x 1, only to name; no focal deficits  Musculoskeletal: 5/5 strength b/l upper and lower extremities  : Contraindicated  Breasts: Contraindicated  Rectal: Contraindicated
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
## 2021-02-16 NOTE — ED BEHAVIORAL HEALTH NOTE - BEHAVIORAL HEALTH NOTE
PROGRESS NOTE: 21 @ 14:06  	  • Reason for Ongoing Consultation: 	    ID: [id number] Male with HEALTH ISSUES - PROBLEM Dx:  Dementia in other conditions, with behavioral disturbance    Patient a 76 y/o  male, domiciled with wife, no past Psychiatric hx; no prior SI/SA; no drug abuse hx, medically has Dementia, with BPH; DM; HLD; HTN; Arthritis was BIB/EMS activated by wife due to agitation.  Chart reviewed, case discussed with team and patient seen.   Urology recs noted, with assessment of Ureteral Colic and recommend Flomax and Antibiotics.   Patient agitated overnight requiring Haldol 5mg IM and multiple doses of Ativan     Patient seen today for follow up and seen laying in bed sleeping. Patient awoke on approach and was somnolent and confused while oriented to person only. Patient re oriented by writer multiple times that he is in the hospital but with poor attention needing continuous redirection and unable to repeat when asked again. Patient constantly asking to go to bathroom despite being observed with urinary incontinence.     Attemtped to call patients wife Nhi but no answer [phone]      REVIEW OF SYSTEMS:   • Constitutional Symptoms	disoriented and unable to determined   • Eyes	disoriented and unable to determined   • Ears / Nose / Throat / Mouth	disoriented and unable to determined   • Cardiovascular	disoriented and unable to determined   • Respiratory	disoriented and unable to determined   • Gastrointestinal	disoriented and unable to determined   • Genitourinary	disoriented and unable to determined   • Musculoskeletal	disoriented and unable to determined   • Skin	disoriented and unable to determined   • Neurological	disoriented and unable to determined   • Psychiatric (see HPI)	See HPI  • Endocrine	disoriented and unable to determined   • Hematologic / Lymphatic	disoriented and unable to determined   • Allergic / Immunologic	disoriented and unable to determined   REVIEW OF VITALS/LABS/IMAGING/INVESTIGATIONS:   • Vital signs reviewed: Yes  • Vital Signs:	    T(C): 36.7 (21 @ 11:16), Max: 37.2 (21 @ 08:49)  HR: 83 (21 @ 11:16) (71 - 86)  BP: 155/85 (21 @ 11:16) (130/84 - 176/90)  RR: 18 (21 @ 11:16) (18 - 20)  SpO2: 95% (- @ 11:16) (95% - 99%)    • Available labs reviewed: Yes  • Available Lab Results:                           14.8   11.88 )-----------( 186      ( 15 Feb 2021 04:50 )             44.0     02-15    140  |  104  |  27.0<H>  ----------------------------<  118<H>  4.4   |  21.0<L>  |  0.95    Ca    9.6      15 Feb 2021 04:50    TPro  6.7  /  Alb  3.7  /  TBili  0.6  /  DBili  x   /  AST  41<H>  /  ALT  65<H>  /  AlkPhos  88  02-15    LIVER FUNCTIONS - ( 15 Feb 2021 04:50 )  Alb: 3.7 g/dL / Pro: 6.7 g/dL / ALK PHOS: 88 U/L / ALT: 65 U/L / AST: 41 U/L / GGT: x           PT/INR - ( 15 Feb 2021 05:50 )   PT: 11.5 sec;   INR: 0.99 ratio         PTT - ( 15 Feb 2021 05:50 )  PTT:25.6 sec  Urinalysis Basic - ( 15 Feb 2021 05:50 )    Color: Yellow / Appearance: Slightly Turbid / S.015 / pH: x  Gluc: x / Ketone: Trace  / Bili: Negative / Urobili: Negative mg/dL   Blood: x / Protein: 15 mg/dL / Nitrite: Negative   Leuk Esterase: Small / RBC: 11-25 /HPF / WBC 3-5   Sq Epi: x / Non Sq Epi: Occasional / Bacteria: Occasional          MEDICATIONS:      PRN Medications:  • PRN Medications since last evaluation	  • PRN Details	  Haldol 5mg IM   Current Medications:   acetaminophen   Tablet .. 650 milliGRAM(s) Oral every 6 hours PRN  atorvastatin 80 milliGRAM(s) Oral at bedtime  cefpodoxime 100 milliGRAM(s) Oral every 12 hours  donepezil 10 milliGRAM(s) Oral at bedtime  escitalopram 5 milliGRAM(s) Oral daily  finasteride 5 milliGRAM(s) Oral daily  memantine 10 milliGRAM(s) Oral two times a day  metoprolol tartrate 50 milliGRAM(s) Oral daily  pantoprazole    Tablet 40 milliGRAM(s) Oral before breakfast  QUEtiapine 12.5 milliGRAM(s) Oral at bedtime  tamsulosin 0.4 milliGRAM(s) Oral at bedtime     Medication Side Effects:  • Medication Side Effects or Adverse Reactions (new or ongoing)	None known    MENTAL STATUS EXAM:   • Level of Consciousness	Somnolent   • General Appearance	Well developed  • Body Habitus	Well nourished  • Hygiene	fair   • Grooming	poor   • Behavior	Uncooperative  • Eye Contact	Poor   • Relatedness	Poor   • Impulse Control	Impaired   • Muscle Tone / Strength	Normal muscle tone/strength  • Abnormal Movements	No abnormal movements  • Gait / Station	unable to determined   • Speech Volume	low   • Speech Rate	Normal  • Speech Spontaneity	Normal  • Speech Articulation	Normal  • Mood	disoriented and unable to determined   • Affect Quality	irritable   • Affect Range	Full  • Affect Congruence	Congruent  • Thought Process	impaired reasoning   • Thought Associations	Loose   • Thought Content	Unremarkable  • Perceptions	No abnormalities  • Oriented to Time	NO  • Oriented to Place	No  • Oriented to Situation	No  • Oriented to Person	Yes  • Attention / Concentration	Impaired   • Estimated Intelligence	Average  • Recent Memory	Poor   • Remote Memory	Poor   • Fund of Knowledge	Poor   • Language	No abnormalities noted  • Judgment (regarding everyday events)	Poor  • Insight (regarding psychiatric illness)	 Poor     SUICIDALITY:   • Suicidality (Interval)	none known    HOMICIDALITY/AGGRESSION:   • Homicidality/Aggression	none known    DIAGNOSIS DSM-V:    Psychiatric Diagnosis (Corresponds to DSM-IV Axis I, II):   HEALTH ISSUES - PROBLEM Dx:  Dementia in other conditions, with behavioral disturbance             Medical Diagnosis (Corresponds to DSM-IV Axis III):  • Axis III	      ASSESSMENT OF CURRENT CONDITION:   Summary (include case differential, formulation and patient response to therapy):       Patient a 76 y/o  male, domiciled with wife, no past Psychiatric hx; no prior SI/SA; no drug abuse hx, medically has Dementia, with BPH; DM; HLD; HTN; Arthritis was BIB/EMS activated by wife due to agitation.  Chart reviewed, case discussed with team and patient seen.     Patient seen for follow up and oriented to person only with increased confusion and episodes of agitation. Patient with no past Psych history and a history of dementia with sudden worsening of agitation and ureteral colic. Likely delirium superimposed on his dementia. Recommend medical admit and to continue work up to r/o additional medical causes that may be contributing ot his worsening agitation. CL Psych will continue  follow for medication management of  his agitation.     Recs.   -Maintain delirium precautions   -Avoid anticholinergic meds, benzos and opioids as they can worsen his mental status   -Frequent re orientation, hydration, ambulation with PT if possible   -Recommend continued medical work up to r/o other potential causes for sudden worsening of agitation   -consider checking Ammonia, B12, Folate, TSH, Free T4   -Urology recs noted, continue to follow   -Recommend to increase Seroquel to 12.5mg PO Q12 hours   -for agitation, can give Haldol 2-5mg PO/IM Q6 hours PRN, (try to avoid benzos as they can worsen confusion and lead to inc agitation)    -Continue Namenda 10mg PO BID   -Continue Lexapro 5mg po daily  -Will follow

## 2021-02-16 NOTE — H&P ADULT - ASSESSMENT
76 y/o with PMH of Dementia, PH, DM, HLD, HTN, Arthritis was BIB/EMS activated by wife for evaluation of acute and significant atraumatic left hip pain, admitted for delirium from unknown source.    #Delirium  #Agitation  - evaluated by psych, recs appreciated  - Maintain delirium precautions   - Avoid anticholinergic meds, benzos and opioids as they can worsen his mental status   - Frequent re orientation, hydration, ambulation with PT if possible   - f/u checking Ammonia, B12, Folate, TSH, Free T4   - increase Seroquel to 12.5mg PO Q12 hours per psych  - for agitation, can give Haldol 2-5mg PO/IM Q6 hours PRN  - Continue Namenda 10mg PO BID   - Continue Lexapro 5mg po daily  - patient with UTI, possibly worsening delirium    #UTI  - UA positive, Culture growing pseudomonas aeruginosa  - received ceftriaxone in ED   - start cefepime  - delirium possibly worsening from UTI    #Kidney stone  - CT AP - Moderate left hydroureteronephrosis and left perinephric fat stranding and fluid secondary to a 5 mm calculus in the distal left ureter. Bilateral non-obstructing renal calculi measuring up to 5 mm. Severe degenerative changes in the hips, greater on the left.  - evaluated by urology, no acute surgical intervention at this time    #DM  - f/u HbA1c  - insulin sliding scale  - goal -180    #HTN  #HLD  - home meds - metoprolol and atorvastatin, continue     #prophylaxis  - DVT - lovenox  - GI - ppi    76 y/o with PMH of Dementia, PH, DM, HLD, HTN, Arthritis was BIB/EMS activated by wife for evaluation of acute and significant atraumatic left hip pain, admitted for delirium from unknown source.    #Delirium  #Agitation  - evaluated by psych, recs appreciated  - Maintain delirium precautions   - Avoid anticholinergic meds, benzos and opioids as they can worsen his mental status   - Frequent re orientation, hydration, ambulation with PT if possible   - f/u checking Ammonia, B12, Folate, TSH, Free T4   - increase Seroquel to 12.5mg PO Q12 hours per psych  - for agitation, can give Haldol 2-5mg PO/IM Q6 hours PRN  - Continue Namenda 10mg PO BID   - Continue Lexapro 5mg po daily  - patient with UTI, possibly worsening delirium    #UTI  - UA positive, Culture growing pseudomonas aeruginosa  - received ceftriaxone in ED   - start cefepime  - received IVF in ED  - delirium possibly worsening from UTI    #Kidney stone  - CT AP - Moderate left hydroureteronephrosis and left perinephric fat stranding and fluid secondary to a 5 mm calculus in the distal left ureter. Bilateral non-obstructing renal calculi measuring up to 5 mm. Severe degenerative changes in the hips, greater on the left.  - evaluated by urology, no acute surgical intervention at this time    #DM  - f/u HbA1c  - insulin sliding scale  - goal -180    #HTN  #HLD  - home meds - metoprolol and atorvastatin, continue     #prophylaxis  - DVT - lovenox  - GI - ppi    76 y/o with PMH of Dementia, PH, DM, HLD, HTN, Arthritis was BIB/EMS activated by wife for evaluation of acute and significant atraumatic left hip pain, admitted for delirium from unknown source.    #Delirium  #Agitation  - evaluated by psych, recs appreciated  - Maintain delirium precautions   - Avoid anticholinergic meds, benzos and opioids as they can worsen his mental status   - Frequent re orientation, hydration, ambulation with PT if possible   - f/u checking Ammonia, B12, Folate, TSH, Free T4   - increase Seroquel to 12.5mg PO Q12 hours per psych  - for agitation, can give Haldol 2-5mg PO/IM Q6 hours PRN  - Continue Namenda 10mg PO BID   - Continue Lexapro 5mg po daily  - patient with UTI, possibly worsening delirium    #UTI  - UA positive, Culture growing pseudomonas aeruginosa  - received ceftriaxone in ED   - start cefepime  - received IVF in ED  - delirium possibly worsening from UTI    #Kidney stone  - CT AP - Moderate left hydroureteronephrosis and left perinephric fat stranding and fluid secondary to a 5 mm calculus in the distal left ureter. Bilateral non-obstructing renal calculi measuring up to 5 mm. Severe degenerative changes in the hips, greater on the left.  - evaluated by urology, no acute surgical intervention at this time     #DM  - f/u HbA1c  - insulin sliding scale  - goal -180    #HTN  #HLD  - home meds - metoprolol and atorvastatin, continue     #prophylaxis  - DVT - lovenox  - GI - ppi    74 y/o with PMH of Dementia, PH, DM, HLD, HTN, Arthritis was BIB/EMS activated by wife for evaluation of acute and significant atraumatic left hip pain, admitted for delirium from unknown source.    #Delirium  #Agitation  - evaluated by psych, recs appreciated  - Maintain delirium precautions   - Avoid anticholinergic meds, benzos and opioids as they can worsen his mental status   - Frequent re orientation, hydration, ambulation with PT if possible   - f/u checking Ammonia, B12, Folate, TSH, Free T4   - increase Seroquel to 12.5mg PO Q12 hours per psych  - for agitation, can give Haldol 2-5mg PO/IM Q6 hours PRN  - Continue Namenda 10mg PO BID   - Continue Lexapro 5mg po daily  - patient with UTI, possibly worsening delirium  - f/u [facility]    #UTI  - UA positive, Culture growing pseudomonas aeruginosa  - received ceftriaxone in ED   - start cefepime  - received IVF in ED  - delirium possibly worsening from UTI    #Kidney stone  - CT AP - Moderate left hydroureteronephrosis and left perinephric fat stranding and fluid secondary to a 5 mm calculus in the distal left ureter. Bilateral non-obstructing renal calculi measuring up to 5 mm. Severe degenerative changes in the hips, greater on the left.  - evaluated by urology, no acute surgical intervention at this time     #DM  - f/u HbA1c  - insulin sliding scale  - goal -180    #HTN  #HLD  - home meds - metoprolol and atorvastatin, continue     #prophylaxis  - DVT - lovenox  - GI - ppi

## 2021-02-16 NOTE — ED CDU PROVIDER DISPOSITION NOTE - CLINICAL COURSE
75M p/w kidney stone, also with worsening dementia/delerium. Seen by Urology, started on vantin. Medications adjusted by psychiatry without improvement. Patient requiring admission for worsened delirium. Psychiatry to follow.

## 2021-02-17 DIAGNOSIS — R41.0 DISORIENTATION, UNSPECIFIED: ICD-10-CM

## 2021-02-17 DIAGNOSIS — N23 UNSPECIFIED RENAL COLIC: ICD-10-CM

## 2021-02-17 DIAGNOSIS — N39.0 URINARY TRACT INFECTION, SITE NOT SPECIFIED: ICD-10-CM

## 2021-02-17 DIAGNOSIS — E78.5 HYPERLIPIDEMIA, UNSPECIFIED: ICD-10-CM

## 2021-02-17 DIAGNOSIS — F03.90 UNSPECIFIED DEMENTIA, UNSPECIFIED SEVERITY, WITHOUT BEHAVIORAL DISTURBANCE, PSYCHOTIC DISTURBANCE, MOOD DISTURBANCE, AND ANXIETY: ICD-10-CM

## 2021-02-17 DIAGNOSIS — I10 ESSENTIAL (PRIMARY) HYPERTENSION: ICD-10-CM

## 2021-02-17 LAB
-  AMIKACIN: SIGNIFICANT CHANGE UP
-  AZTREONAM: SIGNIFICANT CHANGE UP
-  CEFEPIME: SIGNIFICANT CHANGE UP
-  CEFTAZIDIME: SIGNIFICANT CHANGE UP
-  CIPROFLOXACIN: SIGNIFICANT CHANGE UP
-  GENTAMICIN: SIGNIFICANT CHANGE UP
-  IMIPENEM: SIGNIFICANT CHANGE UP
-  LEVOFLOXACIN: SIGNIFICANT CHANGE UP
-  MEROPENEM: SIGNIFICANT CHANGE UP
-  PIPERACILLIN/TAZOBACTAM: SIGNIFICANT CHANGE UP
-  TOBRAMYCIN: SIGNIFICANT CHANGE UP
ALBUMIN SERPL ELPH-MCNC: 3.2 G/DL — LOW (ref 3.3–5.2)
ALP SERPL-CCNC: 72 U/L — SIGNIFICANT CHANGE UP (ref 40–120)
ALT FLD-CCNC: 45 U/L — HIGH
ANION GAP SERPL CALC-SCNC: 12 MMOL/L — SIGNIFICANT CHANGE UP (ref 5–17)
AST SERPL-CCNC: 62 U/L — HIGH
BILIRUB SERPL-MCNC: 0.9 MG/DL — SIGNIFICANT CHANGE UP (ref 0.4–2)
BUN SERPL-MCNC: 13 MG/DL — SIGNIFICANT CHANGE UP (ref 8–20)
CALCIUM SERPL-MCNC: 9.1 MG/DL — SIGNIFICANT CHANGE UP (ref 8.6–10.2)
CHLORIDE SERPL-SCNC: 107 MMOL/L — SIGNIFICANT CHANGE UP (ref 98–107)
CO2 SERPL-SCNC: 21 MMOL/L — LOW (ref 22–29)
CREAT SERPL-MCNC: 0.85 MG/DL — SIGNIFICANT CHANGE UP (ref 0.5–1.3)
CULTURE RESULTS: SIGNIFICANT CHANGE UP
GLUCOSE SERPL-MCNC: 110 MG/DL — HIGH (ref 70–99)
HCT VFR BLD CALC: 41 % — SIGNIFICANT CHANGE UP (ref 39–50)
HGB BLD-MCNC: 13.9 G/DL — SIGNIFICANT CHANGE UP (ref 13–17)
MCHC RBC-ENTMCNC: 29.6 PG — SIGNIFICANT CHANGE UP (ref 27–34)
MCHC RBC-ENTMCNC: 33.9 GM/DL — SIGNIFICANT CHANGE UP (ref 32–36)
MCV RBC AUTO: 87.4 FL — SIGNIFICANT CHANGE UP (ref 80–100)
METHOD TYPE: SIGNIFICANT CHANGE UP
ORGANISM # SPEC MICROSCOPIC CNT: SIGNIFICANT CHANGE UP
ORGANISM # SPEC MICROSCOPIC CNT: SIGNIFICANT CHANGE UP
PLATELET # BLD AUTO: 167 K/UL — SIGNIFICANT CHANGE UP (ref 150–400)
POTASSIUM SERPL-MCNC: 3.5 MMOL/L — SIGNIFICANT CHANGE UP (ref 3.5–5.3)
POTASSIUM SERPL-SCNC: 3.5 MMOL/L — SIGNIFICANT CHANGE UP (ref 3.5–5.3)
PROT SERPL-MCNC: 6.2 G/DL — LOW (ref 6.6–8.7)
RBC # BLD: 4.69 M/UL — SIGNIFICANT CHANGE UP (ref 4.2–5.8)
RBC # FLD: 13.4 % — SIGNIFICANT CHANGE UP (ref 10.3–14.5)
SARS-COV-2 IGG SERPL QL IA: NEGATIVE — SIGNIFICANT CHANGE UP
SARS-COV-2 IGM SERPL IA-ACNC: <0.1 INDEX — SIGNIFICANT CHANGE UP
SODIUM SERPL-SCNC: 140 MMOL/L — SIGNIFICANT CHANGE UP (ref 135–145)
SPECIMEN SOURCE: SIGNIFICANT CHANGE UP
T4 FREE SERPL-MCNC: 1.3 NG/DL — SIGNIFICANT CHANGE UP (ref 0.9–1.8)
WBC # BLD: 9.29 K/UL — SIGNIFICANT CHANGE UP (ref 3.8–10.5)
WBC # FLD AUTO: 9.29 K/UL — SIGNIFICANT CHANGE UP (ref 3.8–10.5)

## 2021-02-17 PROCEDURE — 99233 SBSQ HOSP IP/OBS HIGH 50: CPT

## 2021-02-17 RX ORDER — HALOPERIDOL DECANOATE 100 MG/ML
2 INJECTION INTRAMUSCULAR ONCE
Refills: 0 | Status: COMPLETED | OUTPATIENT
Start: 2021-02-17 | End: 2021-02-17

## 2021-02-17 RX ORDER — HYDRALAZINE HCL 50 MG
10 TABLET ORAL ONCE
Refills: 0 | Status: COMPLETED | OUTPATIENT
Start: 2021-02-17 | End: 2021-02-17

## 2021-02-17 RX ADMIN — Medication 10 MILLIGRAM(S): at 23:00

## 2021-02-17 RX ADMIN — PANTOPRAZOLE SODIUM 40 MILLIGRAM(S): 20 TABLET, DELAYED RELEASE ORAL at 09:22

## 2021-02-17 RX ADMIN — HALOPERIDOL DECANOATE 2 MILLIGRAM(S): 100 INJECTION INTRAMUSCULAR at 02:13

## 2021-02-17 RX ADMIN — MEMANTINE HYDROCHLORIDE 10 MILLIGRAM(S): 10 TABLET ORAL at 16:33

## 2021-02-17 RX ADMIN — DONEPEZIL HYDROCHLORIDE 10 MILLIGRAM(S): 10 TABLET, FILM COATED ORAL at 22:41

## 2021-02-17 RX ADMIN — CEFEPIME 100 MILLIGRAM(S): 1 INJECTION, POWDER, FOR SOLUTION INTRAMUSCULAR; INTRAVENOUS at 15:08

## 2021-02-17 RX ADMIN — MEMANTINE HYDROCHLORIDE 10 MILLIGRAM(S): 10 TABLET ORAL at 09:22

## 2021-02-17 RX ADMIN — CEFEPIME 100 MILLIGRAM(S): 1 INJECTION, POWDER, FOR SOLUTION INTRAMUSCULAR; INTRAVENOUS at 22:41

## 2021-02-17 RX ADMIN — TAMSULOSIN HYDROCHLORIDE 0.4 MILLIGRAM(S): 0.4 CAPSULE ORAL at 22:41

## 2021-02-17 RX ADMIN — Medication 50 MILLIGRAM(S): at 09:22

## 2021-02-17 RX ADMIN — ENOXAPARIN SODIUM 40 MILLIGRAM(S): 100 INJECTION SUBCUTANEOUS at 15:09

## 2021-02-17 RX ADMIN — ESCITALOPRAM OXALATE 5 MILLIGRAM(S): 10 TABLET, FILM COATED ORAL at 15:10

## 2021-02-17 RX ADMIN — QUETIAPINE FUMARATE 12.5 MILLIGRAM(S): 200 TABLET, FILM COATED ORAL at 22:41

## 2021-02-17 RX ADMIN — ATORVASTATIN CALCIUM 80 MILLIGRAM(S): 80 TABLET, FILM COATED ORAL at 22:41

## 2021-02-17 RX ADMIN — FINASTERIDE 5 MILLIGRAM(S): 5 TABLET, FILM COATED ORAL at 15:09

## 2021-02-17 RX ADMIN — CEFEPIME 100 MILLIGRAM(S): 1 INJECTION, POWDER, FOR SOLUTION INTRAMUSCULAR; INTRAVENOUS at 05:23

## 2021-02-17 NOTE — PROGRESS NOTE ADULT - ATTENDING COMMENTS
Discussed with RN, updated wife plan of care.  She reports being overwhelmed taking care of him, discussed discharge planning, open to placement at this time. Social work contacted to assist with discharge planning.

## 2021-02-17 NOTE — CHART NOTE - NSCHARTNOTEFT_GEN_A_CORE
Called by RN for an elevated BP.  Pt with hx of HTN on Metoprolol 50mg daily, received in am.  Pt is confused, baseline, without any complaints.  In no distress.    ICU Vital Signs Last 24 Hrs  T(C): 36.7 (17 Feb 2021 22:24), Max: 37.1 (17 Feb 2021 04:33)  T(F): 98.1 (17 Feb 2021 22:24), Max: 98.7 (17 Feb 2021 04:33)  HR: 60 (17 Feb 2021 22:24) (60 - 103)  BP: 181/89 (17 Feb 2021 22:24) (137/81 - 185/83)  BP(mean): --  ABP: --  ABP(mean): --  RR: 19 (17 Feb 2021 08:00) (19 - 20)  SpO2: 95% (17 Feb 2021 22:24) (93% - 96%)    Ordered Hydralazine 10mg IVPx1 stat  RN to repeat vitals manually in 1 hr and alert PA or for change in pts status

## 2021-02-18 LAB — SARS-COV-2 RNA SPEC QL NAA+PROBE: SIGNIFICANT CHANGE UP

## 2021-02-18 PROCEDURE — 99232 SBSQ HOSP IP/OBS MODERATE 35: CPT

## 2021-02-18 PROCEDURE — 70450 CT HEAD/BRAIN W/O DYE: CPT | Mod: 26

## 2021-02-18 RX ORDER — MULTIVIT-MIN/FERROUS GLUCONATE 9 MG/15 ML
1 LIQUID (ML) ORAL DAILY
Refills: 0 | Status: DISCONTINUED | OUTPATIENT
Start: 2021-02-18 | End: 2021-02-20

## 2021-02-18 RX ORDER — CIPROFLOXACIN LACTATE 400MG/40ML
500 VIAL (ML) INTRAVENOUS EVERY 12 HOURS
Refills: 0 | Status: DISCONTINUED | OUTPATIENT
Start: 2021-02-18 | End: 2021-02-20

## 2021-02-18 RX ORDER — MULTIVIT-MIN/FERROUS GLUCONATE 9 MG/15 ML
1 LIQUID (ML) ORAL
Qty: 0 | Refills: 0 | DISCHARGE

## 2021-02-18 RX ORDER — ATORVASTATIN CALCIUM 80 MG/1
1 TABLET, FILM COATED ORAL
Qty: 0 | Refills: 0 | DISCHARGE

## 2021-02-18 RX ORDER — METFORMIN HYDROCHLORIDE 850 MG/1
1 TABLET ORAL
Qty: 0 | Refills: 0 | DISCHARGE

## 2021-02-18 RX ORDER — TAMSULOSIN HYDROCHLORIDE 0.4 MG/1
1 CAPSULE ORAL
Qty: 0 | Refills: 0 | DISCHARGE

## 2021-02-18 RX ORDER — EZETIMIBE 10 MG/1
1 TABLET ORAL
Qty: 0 | Refills: 0 | DISCHARGE

## 2021-02-18 RX ORDER — ALPRAZOLAM 0.25 MG
0.25 TABLET ORAL AT BEDTIME
Refills: 0 | Status: DISCONTINUED | OUTPATIENT
Start: 2021-02-18 | End: 2021-02-20

## 2021-02-18 RX ORDER — ALPRAZOLAM 0.25 MG
1 TABLET ORAL
Qty: 0 | Refills: 0 | DISCHARGE

## 2021-02-18 RX ORDER — ASPIRIN/CALCIUM CARB/MAGNESIUM 324 MG
0 TABLET ORAL
Qty: 0 | Refills: 0 | DISCHARGE

## 2021-02-18 RX ORDER — RAMIPRIL 5 MG
1 CAPSULE ORAL
Qty: 0 | Refills: 0 | DISCHARGE

## 2021-02-18 RX ORDER — SACCHAROMYCES BOULARDII 250 MG
250 POWDER IN PACKET (EA) ORAL
Refills: 0 | Status: DISCONTINUED | OUTPATIENT
Start: 2021-02-18 | End: 2021-02-20

## 2021-02-18 RX ORDER — LISINOPRIL 2.5 MG/1
40 TABLET ORAL DAILY
Refills: 0 | Status: DISCONTINUED | OUTPATIENT
Start: 2021-02-18 | End: 2021-02-20

## 2021-02-18 RX ORDER — OMEPRAZOLE 10 MG/1
1 CAPSULE, DELAYED RELEASE ORAL
Qty: 0 | Refills: 0 | DISCHARGE

## 2021-02-18 RX ORDER — ASPIRIN/CALCIUM CARB/MAGNESIUM 324 MG
81 TABLET ORAL DAILY
Refills: 0 | Status: DISCONTINUED | OUTPATIENT
Start: 2021-02-18 | End: 2021-02-20

## 2021-02-18 RX ADMIN — LISINOPRIL 40 MILLIGRAM(S): 2.5 TABLET ORAL at 17:08

## 2021-02-18 RX ADMIN — MEMANTINE HYDROCHLORIDE 10 MILLIGRAM(S): 10 TABLET ORAL at 17:11

## 2021-02-18 RX ADMIN — Medication 250 MILLIGRAM(S): at 17:11

## 2021-02-18 RX ADMIN — TAMSULOSIN HYDROCHLORIDE 0.4 MILLIGRAM(S): 0.4 CAPSULE ORAL at 20:58

## 2021-02-18 RX ADMIN — ESCITALOPRAM OXALATE 5 MILLIGRAM(S): 10 TABLET, FILM COATED ORAL at 13:06

## 2021-02-18 RX ADMIN — ATORVASTATIN CALCIUM 80 MILLIGRAM(S): 80 TABLET, FILM COATED ORAL at 20:58

## 2021-02-18 RX ADMIN — Medication 81 MILLIGRAM(S): at 17:08

## 2021-02-18 RX ADMIN — QUETIAPINE FUMARATE 12.5 MILLIGRAM(S): 200 TABLET, FILM COATED ORAL at 20:58

## 2021-02-18 RX ADMIN — Medication 500 MILLIGRAM(S): at 17:08

## 2021-02-18 RX ADMIN — Medication 50 MILLIGRAM(S): at 05:37

## 2021-02-18 RX ADMIN — PANTOPRAZOLE SODIUM 40 MILLIGRAM(S): 20 TABLET, DELAYED RELEASE ORAL at 05:37

## 2021-02-18 RX ADMIN — MEMANTINE HYDROCHLORIDE 10 MILLIGRAM(S): 10 TABLET ORAL at 05:37

## 2021-02-18 RX ADMIN — DONEPEZIL HYDROCHLORIDE 10 MILLIGRAM(S): 10 TABLET, FILM COATED ORAL at 20:58

## 2021-02-18 RX ADMIN — FINASTERIDE 5 MILLIGRAM(S): 5 TABLET, FILM COATED ORAL at 13:06

## 2021-02-18 RX ADMIN — ENOXAPARIN SODIUM 40 MILLIGRAM(S): 100 INJECTION SUBCUTANEOUS at 13:06

## 2021-02-18 RX ADMIN — Medication 1 TABLET(S): at 20:58

## 2021-02-18 RX ADMIN — CEFEPIME 100 MILLIGRAM(S): 1 INJECTION, POWDER, FOR SOLUTION INTRAMUSCULAR; INTRAVENOUS at 05:36

## 2021-02-18 NOTE — PROGRESS NOTE ADULT - ATTENDING COMMENTS
Attempt enhanced supervision, discharge planning to [facility] in 24 hrs. Discussed with RN and social work. Attempt enhanced supervision, discharge planning to [facility] in 24 hrs. Discussed with RN and social work.  Updated wife Nhi plan of care.

## 2021-02-19 ENCOUNTER — TRANSCRIPTION ENCOUNTER (OUTPATIENT)
Age: 76
End: 2021-02-19

## 2021-02-19 LAB
ANION GAP SERPL CALC-SCNC: 15 MMOL/L — SIGNIFICANT CHANGE UP (ref 5–17)
BUN SERPL-MCNC: 22 MG/DL — HIGH (ref 8–20)
CALCIUM SERPL-MCNC: 9.7 MG/DL — SIGNIFICANT CHANGE UP (ref 8.6–10.2)
CHLORIDE SERPL-SCNC: 105 MMOL/L — SIGNIFICANT CHANGE UP (ref 98–107)
CO2 SERPL-SCNC: 22 MMOL/L — SIGNIFICANT CHANGE UP (ref 22–29)
CREAT SERPL-MCNC: 0.93 MG/DL — SIGNIFICANT CHANGE UP (ref 0.5–1.3)
GLUCOSE SERPL-MCNC: 146 MG/DL — HIGH (ref 70–99)
HCT VFR BLD CALC: 45.8 % — SIGNIFICANT CHANGE UP (ref 39–50)
HGB BLD-MCNC: 15.2 G/DL — SIGNIFICANT CHANGE UP (ref 13–17)
MCHC RBC-ENTMCNC: 29.1 PG — SIGNIFICANT CHANGE UP (ref 27–34)
MCHC RBC-ENTMCNC: 33.2 GM/DL — SIGNIFICANT CHANGE UP (ref 32–36)
MCV RBC AUTO: 87.7 FL — SIGNIFICANT CHANGE UP (ref 80–100)
PLATELET # BLD AUTO: 235 K/UL — SIGNIFICANT CHANGE UP (ref 150–400)
POTASSIUM SERPL-MCNC: 3.6 MMOL/L — SIGNIFICANT CHANGE UP (ref 3.5–5.3)
POTASSIUM SERPL-SCNC: 3.6 MMOL/L — SIGNIFICANT CHANGE UP (ref 3.5–5.3)
RBC # BLD: 5.22 M/UL — SIGNIFICANT CHANGE UP (ref 4.2–5.8)
RBC # FLD: 13.7 % — SIGNIFICANT CHANGE UP (ref 10.3–14.5)
SODIUM SERPL-SCNC: 142 MMOL/L — SIGNIFICANT CHANGE UP (ref 135–145)
WBC # BLD: 11.04 K/UL — HIGH (ref 3.8–10.5)
WBC # FLD AUTO: 11.04 K/UL — HIGH (ref 3.8–10.5)

## 2021-02-19 PROCEDURE — 99232 SBSQ HOSP IP/OBS MODERATE 35: CPT

## 2021-02-19 RX ORDER — SACCHAROMYCES BOULARDII 250 MG
1 POWDER IN PACKET (EA) ORAL
Qty: 0 | Refills: 0 | DISCHARGE
Start: 2021-02-19

## 2021-02-19 RX ORDER — FINASTERIDE 5 MG/1
1 TABLET, FILM COATED ORAL
Qty: 0 | Refills: 0 | DISCHARGE
Start: 2021-02-19

## 2021-02-19 RX ORDER — MEMANTINE HYDROCHLORIDE 10 MG/1
1 TABLET ORAL
Qty: 0 | Refills: 0 | DISCHARGE
Start: 2021-02-19

## 2021-02-19 RX ORDER — DIVALPROEX SODIUM 500 MG/1
1 TABLET, DELAYED RELEASE ORAL
Qty: 0 | Refills: 0 | DISCHARGE

## 2021-02-19 RX ORDER — METOPROLOL TARTRATE 50 MG
1 TABLET ORAL
Qty: 0 | Refills: 0 | DISCHARGE
Start: 2021-02-19

## 2021-02-19 RX ORDER — METOPROLOL TARTRATE 50 MG
1 TABLET ORAL
Qty: 0 | Refills: 0 | DISCHARGE

## 2021-02-19 RX ORDER — MEMANTINE HYDROCHLORIDE 10 MG/1
1 TABLET ORAL
Qty: 0 | Refills: 0 | DISCHARGE

## 2021-02-19 RX ORDER — DONEPEZIL HYDROCHLORIDE 10 MG/1
1 TABLET, FILM COATED ORAL
Qty: 0 | Refills: 0 | DISCHARGE
Start: 2021-02-19

## 2021-02-19 RX ORDER — CELECOXIB 200 MG/1
1 CAPSULE ORAL
Qty: 0 | Refills: 0 | DISCHARGE

## 2021-02-19 RX ORDER — DONEPEZIL HYDROCHLORIDE 10 MG/1
1 TABLET, FILM COATED ORAL
Qty: 0 | Refills: 0 | DISCHARGE

## 2021-02-19 RX ORDER — CIPROFLOXACIN LACTATE 400MG/40ML
1 VIAL (ML) INTRAVENOUS
Qty: 0 | Refills: 0 | DISCHARGE
Start: 2021-02-19

## 2021-02-19 RX ORDER — ESCITALOPRAM OXALATE 10 MG/1
1 TABLET, FILM COATED ORAL
Qty: 0 | Refills: 0 | DISCHARGE
Start: 2021-02-19

## 2021-02-19 RX ORDER — MIRTAZAPINE 45 MG/1
1 TABLET, ORALLY DISINTEGRATING ORAL
Qty: 0 | Refills: 0 | DISCHARGE

## 2021-02-19 RX ORDER — QUETIAPINE FUMARATE 200 MG/1
12.5 TABLET, FILM COATED ORAL
Qty: 0 | Refills: 0 | DISCHARGE
Start: 2021-02-19

## 2021-02-19 RX ADMIN — Medication 1 TABLET(S): at 12:48

## 2021-02-19 RX ADMIN — Medication 50 MILLIGRAM(S): at 05:07

## 2021-02-19 RX ADMIN — MEMANTINE HYDROCHLORIDE 10 MILLIGRAM(S): 10 TABLET ORAL at 05:07

## 2021-02-19 RX ADMIN — MEMANTINE HYDROCHLORIDE 10 MILLIGRAM(S): 10 TABLET ORAL at 16:25

## 2021-02-19 RX ADMIN — PANTOPRAZOLE SODIUM 40 MILLIGRAM(S): 20 TABLET, DELAYED RELEASE ORAL at 05:08

## 2021-02-19 RX ADMIN — Medication 250 MILLIGRAM(S): at 05:06

## 2021-02-19 RX ADMIN — ESCITALOPRAM OXALATE 5 MILLIGRAM(S): 10 TABLET, FILM COATED ORAL at 12:48

## 2021-02-19 RX ADMIN — ENOXAPARIN SODIUM 40 MILLIGRAM(S): 100 INJECTION SUBCUTANEOUS at 12:49

## 2021-02-19 RX ADMIN — DONEPEZIL HYDROCHLORIDE 10 MILLIGRAM(S): 10 TABLET, FILM COATED ORAL at 22:49

## 2021-02-19 RX ADMIN — Medication 500 MILLIGRAM(S): at 16:25

## 2021-02-19 RX ADMIN — Medication 81 MILLIGRAM(S): at 12:49

## 2021-02-19 RX ADMIN — Medication 250 MILLIGRAM(S): at 16:25

## 2021-02-19 RX ADMIN — FINASTERIDE 5 MILLIGRAM(S): 5 TABLET, FILM COATED ORAL at 12:48

## 2021-02-19 RX ADMIN — TAMSULOSIN HYDROCHLORIDE 0.4 MILLIGRAM(S): 0.4 CAPSULE ORAL at 22:49

## 2021-02-19 RX ADMIN — Medication 500 MILLIGRAM(S): at 05:06

## 2021-02-19 RX ADMIN — QUETIAPINE FUMARATE 12.5 MILLIGRAM(S): 200 TABLET, FILM COATED ORAL at 22:49

## 2021-02-19 RX ADMIN — ATORVASTATIN CALCIUM 80 MILLIGRAM(S): 80 TABLET, FILM COATED ORAL at 22:49

## 2021-02-19 RX ADMIN — LISINOPRIL 40 MILLIGRAM(S): 2.5 TABLET ORAL at 05:06

## 2021-02-19 NOTE — PROGRESS NOTE ADULT - SUBJECTIVE AND OBJECTIVE BOX
INTERVAL HPI/OVERNIGHT EVENTS:    CC:  renal stone and left hydronephrosis,  delirium sec UTI, dementia, hypertension, hyperlipidemia    Agitated overnight, sleeping this am.    Vital Signs Last 24 Hrs  T(C): 36.8 (18 Feb 2021 08:27), Max: 37.1 (17 Feb 2021 23:33)  T(F): 98.3 (18 Feb 2021 08:27), Max: 98.8 (17 Feb 2021 23:33)  HR: 65 (18 Feb 2021 08:27) (60 - 111)  BP: 165/82 (18 Feb 2021 08:27) (130/72 - 185/83)  BP(mean): --  RR: 20 (18 Feb 2021 08:27) (18 - 20)  SpO2: 97% (18 Feb 2021 08:27) (95% - 97%)    PHYSICAL EXAM:    GENERAL: not in distress  CHEST/LUNG: b/l air entry  HEART: reg  ABDOMEN: soft, bs+  EXTREMITIES:  no edema, tenderness    MEDICATIONS  (STANDING):  atorvastatin 80 milliGRAM(s) Oral at bedtime  cefepime   IVPB      cefepime   IVPB 1000 milliGRAM(s) IV Intermittent every 8 hours  donepezil 10 milliGRAM(s) Oral at bedtime  enoxaparin Injectable 40 milliGRAM(s) SubCutaneous daily  escitalopram 5 milliGRAM(s) Oral daily  finasteride 5 milliGRAM(s) Oral daily  memantine 10 milliGRAM(s) Oral two times a day  metoprolol tartrate 50 milliGRAM(s) Oral daily  pantoprazole    Tablet 40 milliGRAM(s) Oral before breakfast  QUEtiapine 12.5 milliGRAM(s) Oral at bedtime  tamsulosin 0.4 milliGRAM(s) Oral at bedtime    MEDICATIONS  (PRN):  acetaminophen   Tablet .. 650 milliGRAM(s) Oral every 6 hours PRN Moderate Pain (4 - 6)      Allergies    No Known Allergies    Intolerances          LABS:                          13.9   9.29  )-----------( 167      ( 17 Feb 2021 09:01 )             41.0     02-17    140  |  107  |  13.0  ----------------------------<  110<H>  3.5   |  21.0<L>  |  0.85    Ca    9.1      17 Feb 2021 09:01    TPro  6.2<L>  /  Alb  3.2<L>  /  TBili  0.9  /  DBili  x   /  AST  62<H>  /  ALT  45<H>  /  AlkPhos  72  02-17          RADIOLOGY & ADDITIONAL TESTS:  
INTERVAL HPI/OVERNIGHT EVENTS:    CC: renal stone and left hydronephrosis,  delirium sec UTI, dementia, hypertension, hyperlipidemia      Chart and course reviewed. Agitated overnight, verbally abusive towards staff this am, making attempts to get out of bed.  Does not know where he is, denies pain when asked.     Vital Signs Last 24 Hrs  T(C): 36.7 (17 Feb 2021 08:00), Max: 37.1 (17 Feb 2021 04:33)  T(F): 98 (17 Feb 2021 08:00), Max: 98.7 (17 Feb 2021 04:33)  HR: 103 (17 Feb 2021 08:00) (78 - 103)  BP: 165/88 (17 Feb 2021 08:00) (137/81 - 166/86)  BP(mean): --  RR: 19 (17 Feb 2021 08:00) (18 - 20)  SpO2: 95% (17 Feb 2021 08:00) (93% - 96%)    PHYSICAL EXAM:    GENERAL: alert, not in distress, oriented x 1, agitated.  CHEST/LUNG: b/l air entry  HEART: reg  ABDOMEN: soft, bs+  EXTREMITIES:  no edema, tenderness    MEDICATIONS  (STANDING):  atorvastatin 80 milliGRAM(s) Oral at bedtime  cefepime   IVPB      cefepime   IVPB 1000 milliGRAM(s) IV Intermittent every 8 hours  donepezil 10 milliGRAM(s) Oral at bedtime  enoxaparin Injectable 40 milliGRAM(s) SubCutaneous daily  escitalopram 5 milliGRAM(s) Oral daily  finasteride 5 milliGRAM(s) Oral daily  memantine 10 milliGRAM(s) Oral two times a day  metoprolol tartrate 50 milliGRAM(s) Oral daily  pantoprazole    Tablet 40 milliGRAM(s) Oral before breakfast  QUEtiapine 12.5 milliGRAM(s) Oral at bedtime  tamsulosin 0.4 milliGRAM(s) Oral at bedtime    MEDICATIONS  (PRN):  acetaminophen   Tablet .. 650 milliGRAM(s) Oral every 6 hours PRN Moderate Pain (4 - 6)      Allergies    No Known Allergies    Intolerances          LABS:                          13.9   9.29  )-----------( 167      ( 17 Feb 2021 09:01 )             41.0     02-17    140  |  107  |  13.0  ----------------------------<  110<H>  3.5   |  21.0<L>  |  0.85    Ca    9.1      17 Feb 2021 09:01    TPro  6.2<L>  /  Alb  3.2<L>  /  TBili  0.9  /  DBili  x   /  AST  62<H>  /  ALT  45<H>  /  AlkPhos  72  02-17          RADIOLOGY & ADDITIONAL TESTS:  
INTERVAL HPI/OVERNIGHT EVENTS:    CC:  renal stone and left hydronephrosis,  delirium sec UTI, dementia, hypertension, hyperlipidemia    No overnight events, more calm this am.    Vital Signs Last 24 Hrs  T(C): 36.7 (19 Feb 2021 08:00), Max: 36.9 (18 Feb 2021 20:59)  T(F): 98 (19 Feb 2021 08:00), Max: 98.5 (19 Feb 2021 04:11)  HR: 56 (19 Feb 2021 08:00) (56 - 69)  BP: 118/70 (19 Feb 2021 08:00) (118/70 - 169/95)  BP(mean): --  RR: 18 (19 Feb 2021 08:00) (18 - 20)  SpO2: 92% (19 Feb 2021 08:00) (92% - 97%)    PHYSICAL EXAM:    GENERAL: alert, confused, not in distress  CHEST/LUNG: b/l air entry  HEART: reg  ABDOMEN: soft, bs+  EXTREMITIES:  no edema, tenderness    MEDICATIONS  (STANDING):  aspirin  chewable 81 milliGRAM(s) Oral daily  atorvastatin 80 milliGRAM(s) Oral at bedtime  ciprofloxacin     Tablet 500 milliGRAM(s) Oral every 12 hours  donepezil 10 milliGRAM(s) Oral at bedtime  enoxaparin Injectable 40 milliGRAM(s) SubCutaneous daily  escitalopram 5 milliGRAM(s) Oral daily  finasteride 5 milliGRAM(s) Oral daily  lisinopril 40 milliGRAM(s) Oral daily  memantine 10 milliGRAM(s) Oral two times a day  metoprolol tartrate 50 milliGRAM(s) Oral daily  multivitamin/minerals 1 Tablet(s) Oral daily  pantoprazole    Tablet 40 milliGRAM(s) Oral before breakfast  QUEtiapine 12.5 milliGRAM(s) Oral at bedtime  saccharomyces boulardii 250 milliGRAM(s) Oral two times a day  tamsulosin 0.4 milliGRAM(s) Oral at bedtime    MEDICATIONS  (PRN):  acetaminophen   Tablet .. 650 milliGRAM(s) Oral every 6 hours PRN Moderate Pain (4 - 6)  ALPRAZolam 0.25 milliGRAM(s) Oral at bedtime PRN insomnia      Allergies    No Known Allergies    Intolerances          LABS:                          15.2   11.04 )-----------( 235      ( 19 Feb 2021 08:40 )             45.8     02-19    142  |  105  |  22.0<H>  ----------------------------<  146<H>  3.6   |  22.0  |  0.93    Ca    9.7      19 Feb 2021 08:40            RADIOLOGY & ADDITIONAL TESTS:

## 2021-02-19 NOTE — PROGRESS NOTE ADULT - ASSESSMENT
75 yr old male with dementia, hypertension, hyperlipidemia, osteoarthritis was BIBEMS for agitation and left hip pain. Per wife, she called EMS as patient had acute onset of left hip pain and worsening agitation at home. Initially admitted to observation, work up revealed moderate left hydronephrosis and renal calculi with a positive UA. Urology was consulted in ED, advised no indication for surgical intervention, recommended oral antibiotics and Flomax with outpatient follow up. Psychiatry was consulted given agitation and his home medications were adjusted. He was on Depakote and Namenda. Namenda dose was increased and Depakote was discontinued. Given worsening agitation in ED, and requiring multiple doses of Haldol, he was admitted for further management of delirium. His UA was positive for Pseudomonas and Cefepime was initiated. PT evaluated the patient, advised home with supervision.
75 yr old male with dementia, hypertension, hyperlipidemia, osteoarthritis was BIBEMS for agitation and left hip pain. Per wife, she called EMS as patient had acute onset of left hip pain and worsening agitation at home. Initially admitted to observation, work up revealed moderate left hydronephrosis and renal calculi with a positive UA. Urology was consulted in ED, advised no indication for surgical intervention, recommended oral antibiotics and Flomax with outpatient follow up. Psychiatry was consulted given agitation and his home medications were adjusted. He was on Depakote and Namenda. Namenda dose was increased and Depakote was discontinued. Given worsening agitation in ED, and requiring multiple doses of Haldol, he was admitted for further management of delirium. His UA was positive for Pseudomonas and Cefepime was initiated. PT evaluated the patient, advised home with supervision. Patient's wife unable to care for patient at home, placement requested, social work consulted. 
75 yr old male with dementia, hypertension, hyperlipidemia, osteoarthritis was BIBEMS for agitation and left hip pain. Per wife, she called EMS as patient had acute onset of left hip pain and worsening agitation at home. Initially admitted to observation, work up revealed moderate left hydronephrosis and renal calculi with a positive UA. Urology was consulted in ED, advised no indication for surgical intervention, recommended oral antibiotics and Flomax with outpatient follow up. Psychiatry was consulted given agitation and his home medications were adjusted. He was on Depakote and Namenda. Namenda dose was increased and Depakote was discontinued. Given worsening agitation in ED, and requiring multiple doses of Haldol, he was admitted for further management of delirium. His UA was positive for Pseudomonas and Cefepime was initiated. PT evaluated the patient, advised home with supervision. Patient's wife unable to care for patient at home, placement requested, social work consulted.

## 2021-02-19 NOTE — DISCHARGE NOTE PROVIDER - NSDCMRMEDTOKEN_GEN_ALL_CORE_FT
aspirin 81 mg oral tablet:   atorvastatin 80 mg oral tablet: 1 tab(s) orally once a day  celecoxib 200 mg oral capsule: 1 cap(s) orally once a day, As Needed  Centrum oral tablet: 1 tab(s) orally once a day  divalproex sodium 250 mg oral delayed release tablet: 1 tab(s) orally once a day (at bedtime)  donepezil 10 mg oral tablet: 1 tab(s) orally once a day (at bedtime)  ezetimibe 10 mg oral tablet: 1 tab(s) orally once a day  memantine 5 mg oral tablet: 1 tab(s) orally 2 times a day  metFORMIN 500 mg oral tablet, extended release: 1 tab(s) orally once a day  metoprolol tartrate 50 mg oral tablet: 1 tab(s) orally once a day  mirtazapine 15 mg oral tablet: 1 tab(s) orally once a day (at bedtime)  omeprazole 20 mg oral delayed release capsule: 1 cap(s) orally once a day  ramipril 10 mg oral capsule: 1 cap(s) orally once a day  tamsulosin 0.4 mg oral capsule: 1 cap(s) orally once a day  Xanax 0.25 mg oral tablet: 1 tab(s) orally once a day (at bedtime)   aspirin 81 mg oral tablet:   atorvastatin 80 mg oral tablet: 1 tab(s) orally once a day  Centrum oral tablet: 1 tab(s) orally once a day  ciprofloxacin 500 mg oral tablet: 1 tab(s) orally every 12 hours for 8 days  donepezil 10 mg oral tablet: 1 tab(s) orally once a day (at bedtime)  escitalopram 5 mg oral tablet: 1 tab(s) orally once a day  ezetimibe 10 mg oral tablet: 1 tab(s) orally once a day  finasteride 5 mg oral tablet: 1 tab(s) orally once a day  memantine 10 mg oral tablet: 1 tab(s) orally 2 times a day  metFORMIN 500 mg oral tablet, extended release: 1 tab(s) orally once a day  metoprolol tartrate 50 mg oral tablet: 1 tab(s) orally once a day  omeprazole 20 mg oral delayed release capsule: 1 cap(s) orally once a day  QUEtiapine: 12.5 milligram(s) orally once a day (at bedtime)  ramipril 10 mg oral capsule: 1 cap(s) orally once a day  saccharomyces boulardii lyo 250 mg oral capsule: 1 cap(s) orally 2 times a day  tamsulosin 0.4 mg oral capsule: 1 cap(s) orally once a day  Xanax 0.25 mg oral tablet: 1 tab(s) orally once a day (at bedtime)

## 2021-02-19 NOTE — PROGRESS NOTE ADULT - PROBLEM SELECTOR PLAN 1
Secondary to underlying UTI, continue antibiotics, medications adjusted as per psychiatry.
Secondary to underlying UTI, continue antibiotics, medications adjusted as per psychiatry. Attempt enhanced supervision.
Secondary to underlying UTI, continue antibiotics, medications adjusted as per psychiatry. Calm this morning.

## 2021-02-19 NOTE — CHART NOTE - NSCHARTNOTEFT_GEN_A_CORE
This is a 75 yr old male; with a past history of dementia, hypertension, hyperlipidemia, osteoarthritis who was brought to the hospital for agitation in the context of left hip pain. Per record, patient was admitted for observation, work up revealed moderate left hydronephrosis and renal calculi with a positive UA. Patient was seen today 02/19/2021 per consult request for agitation.    On assessment today patient is calm and grossly confused, but calm with no acute agitation. He is unable to state the current year, noting "it's 2002". States "the current President is Giovanni...; I don't remember. ..is it Marty?". He is unable to state where he is and has no insight related to his current situation. Denies history of suicidal attempts, noting "are you kidding me?; no". He states "it's hard for me to sit in this area and to continue with anything. I am having trouble with my location", and appears unable to respond appropriately to the assessment questions. However, he reports current sleep as "good sometimes". He is calm and cooperative; without acute mood dysregulations and no evidence of paranoid or delusional thoughts noted during this session. No medication adjustment warranted at this time.     Vital Signs Last 24 Hrs  T(C): 36.7 (19 Feb 2021 08:00), Max: 36.9 (18 Feb 2021 20:59)  T(F): 98 (19 Feb 2021 08:00), Max: 98.5 (19 Feb 2021 04:11)  HR: 56 (19 Feb 2021 08:00) (56 - 69)  BP: 118/70 (19 Feb 2021 08:00) (118/70 - 169/95)  BP(mean): --  RR: 18 (19 Feb 2021 08:00) (18 - 20)  SpO2: 92% (19 Feb 2021 08:00) (92% - 97%) This is a 75 yr old male; with a past history of dementia, hypertension, hyperlipidemia, osteoarthritis who was brought to the hospital for agitation in the context of left hip pain. Per record, patient was admitted for observation, work up revealed moderate left hydronephrosis and renal calculi with a positive UA. Patient was seen today 02/19/2021 per consult request for agitation.    On assessment today patient is calm and grossly confused, but calm with no acute agitation. He is unable to state the current year, noting "it's 2002". States "the current President is Giovanni...; I don't remember. ..is it Marty?". He is unable to state where he is and has no insight related to his current situation. Denies history of suicidal attempts, noting "are you kidding me?; no". He states "it's hard for me to sit in this area and to continue with anything. I am having trouble with my location", and appears unable to respond appropriately to the assessment questions. However, he reports current sleep as "good sometimes". He is calm and cooperative; without acute mood dysregulations and no evidence of paranoid or delusional thoughts noted during this session. No medication adjustment warranted at this time.     Dx: Depressive disorder, unspecified (F32.9)  Dx: Dementia, unspecified type, without behavioral disturbances (F03.90)    PLAN:   Will continue Lexapro 5 mg PO daily for depression.  Will continue Seroquel 12.5 mg PO HS.    Vital Signs Last 24 Hrs  T(C): 36.7 (19 Feb 2021 08:00), Max: 36.9 (18 Feb 2021 20:59)  T(F): 98 (19 Feb 2021 08:00), Max: 98.5 (19 Feb 2021 04:11)  HR: 56 (19 Feb 2021 08:00) (56 - 69)  BP: 118/70 (19 Feb 2021 08:00) (118/70 - 169/95)  BP(mean): --  RR: 18 (19 Feb 2021 08:00) (18 - 20)  SpO2: 92% (19 Feb 2021 08:00) (92% - 97%)    CBC Full  -  ( 19 Feb 2021 08:40 )  WBC Count : 11.04 K/uL  RBC Count : 5.22 M/uL  Hemoglobin : 15.2 g/dL  Hematocrit : 45.8 %  Platelet Count - Automated : 235 K/uL  Mean Cell Volume : 87.7 fl  Mean Cell Hemoglobin : 29.1 pg  Mean Cell Hemoglobin Concentration : 33.2 gm/dL  Auto Neutrophil # : x  Auto Lymphocyte # : x  Auto Monocyte # : x  Auto Eosinophil # : x  Auto Basophil # : x  Auto Neutrophil % : x  Auto Lymphocyte % : x  Auto Monocyte % : x  Auto Eosinophil % : x  Auto Basophil % : x    02-19    142  |  105  |  22.0<H>  ----------------------------<  146<H>  3.6   |  22.0  |  0.93    Ca    9.7      19 Feb 2021 08:40

## 2021-02-19 NOTE — DISCHARGE NOTE PROVIDER - CARE PROVIDER_API CALL
Karlos Desir  NEUROLOGY  [address], [address]  [place] [address]  Phone: [phone]  Fax: [phone]  Follow Up Time:     Moris Falcon)  Urology  [address] [place] [address]  Phone: [phone]  Fax: [phone]  Follow Up Time: 1 week

## 2021-02-19 NOTE — PROGRESS NOTE ADULT - PROBLEM SELECTOR PLAN 4
Improved. No intervention per urology. Continue Flomax.

## 2021-02-19 NOTE — PROGRESS NOTE ADULT - ATTENDING COMMENTS
Discharge planning to [facility]. Discussed with RN and social work. Discharge planning to [facility]. Discussed with RN and social work.  Updated wife Nhi.

## 2021-02-19 NOTE — DISCHARGE NOTE PROVIDER - HOSPITAL COURSE
75 yr old male with dementia, hypertension, hyperlipidemia, osteoarthritis was BIBEMS for agitation and left hip pain. Per wife, she called EMS as patient had acute onset of left hip pain and worsening agitation at home. Initially admitted to observation, work up revealed moderate left hydronephrosis and renal calculi with a positive UA. Urology was consulted in ED, advised no indication for surgical intervention, recommended oral antibiotics and Flomax with outpatient follow up. Psychiatry was consulted given agitation and his home medications were adjusted. He was on Depakote and Namenda. Namenda dose was increased and Depakote was discontinued. Given worsening agitation in ED, and requiring multiple doses of Haldol, he was admitted for further management of delirium. His UA was positive for Pseudomonas and Cefepime was initiated. PT evaluated the patient, advised home with supervision. Patient's wife unable to care for patient at home, placement requested, social work consulted.  CT head without acute changes. Stable for discharge to [facility].     Spent > 35 mins in discharge plan and documentation.    75 yr old male with dementia, hypertension, hyperlipidemia, osteoarthritis was BIBEMS for agitation and left hip pain. Per wife, she called EMS as patient had acute onset of left hip pain and worsening agitation at home. Initially admitted to observation, work up revealed moderate left hydronephrosis and renal calculi with a positive UA. Urology was consulted in ED, advised no indication for surgical intervention, recommended oral antibiotics and Flomax with outpatient follow up. Psychiatry was consulted given agitation and his home medications were adjusted. He was on Depakote and Namenda. Namenda dose was increased and Depakote was discontinued. Given worsening agitation in ED, and requiring multiple doses of Haldol, he was admitted for further management of delirium. His UA was positive for Pseudomonas and Cefepime was initiated. PT evaluated the patient, advised home with supervision. Patient's wife unable to care for patient at home, placement requested, social work consulted.  CT head without acute changes. Stable for discharge to [facility].     Spent > 35 mins in discharge plan and documentation.       pe  GENERAL: alert, confused, not in distress  CHEST/LUNG: b/l air entry  HEART: reg  ABDOMEN: soft, bs+  EXTREMITIES:  no edema, tenderness

## 2021-02-19 NOTE — PROGRESS NOTE ADULT - PROBLEM SELECTOR PLAN 2
Continue Cefepime, will transition to oral antibiotics in 24 hrs.
Continue Ciprofloxacin.
Will start Ciprofloxacin.

## 2021-02-19 NOTE — PROGRESS NOTE ADULT - PROBLEM SELECTOR PLAN 3
Supportive care, continue Namenda, Seroquel and Lexapro. Haldol prn. Fall risk.  Needs placement.
Supportive care, continue Namenda, Seroquel and Lexapro. Haldol prn. Fall risk.  Will need placement.
Supportive care, continue Namenda, Seroquel and Lexapro. Haldol prn. Fall risk.  Spoke with wife, patient reported to be aggressive at home and mostly agitated for 2 years. She is agreeable to placement for now.

## 2021-02-19 NOTE — DISCHARGE NOTE PROVIDER - NSDCCPCAREPLAN_GEN_ALL_CORE_FT
PRINCIPAL DISCHARGE DIAGNOSIS  Diagnosis: Delirium  Assessment and Plan of Treatment: Sec UTI, complete course of antibiotics      SECONDARY DISCHARGE DIAGNOSES  Diagnosis: Dementia  Assessment and Plan of Treatment: Supportive care, continue medications.    Diagnosis: Hyperlipidemia  Assessment and Plan of Treatment: Continue statin.    Diagnosis: Renal colic  Assessment and Plan of Treatment: Continue Flomax, follow up with urology.,

## 2021-02-20 ENCOUNTER — TRANSCRIPTION ENCOUNTER (OUTPATIENT)
Age: 76
End: 2021-02-20

## 2021-02-20 VITALS
SYSTOLIC BLOOD PRESSURE: 143 MMHG | TEMPERATURE: 98 F | HEART RATE: 68 BPM | RESPIRATION RATE: 18 BRPM | OXYGEN SATURATION: 92 % | DIASTOLIC BLOOD PRESSURE: 88 MMHG

## 2021-02-20 LAB
CULTURE RESULTS: SIGNIFICANT CHANGE UP
CULTURE RESULTS: SIGNIFICANT CHANGE UP
SPECIMEN SOURCE: SIGNIFICANT CHANGE UP
SPECIMEN SOURCE: SIGNIFICANT CHANGE UP

## 2021-02-20 PROCEDURE — 99239 HOSP IP/OBS DSCHRG MGMT >30: CPT

## 2021-02-20 RX ADMIN — Medication 1 TABLET(S): at 11:22

## 2021-02-20 RX ADMIN — Medication 500 MILLIGRAM(S): at 05:39

## 2021-02-20 RX ADMIN — Medication 250 MILLIGRAM(S): at 05:39

## 2021-02-20 RX ADMIN — FINASTERIDE 5 MILLIGRAM(S): 5 TABLET, FILM COATED ORAL at 11:22

## 2021-02-20 RX ADMIN — Medication 81 MILLIGRAM(S): at 11:22

## 2021-02-20 RX ADMIN — ENOXAPARIN SODIUM 40 MILLIGRAM(S): 100 INJECTION SUBCUTANEOUS at 11:22

## 2021-02-20 RX ADMIN — Medication 50 MILLIGRAM(S): at 05:39

## 2021-02-20 RX ADMIN — LISINOPRIL 40 MILLIGRAM(S): 2.5 TABLET ORAL at 05:39

## 2021-02-20 RX ADMIN — MEMANTINE HYDROCHLORIDE 10 MILLIGRAM(S): 10 TABLET ORAL at 05:39

## 2021-02-20 RX ADMIN — ESCITALOPRAM OXALATE 5 MILLIGRAM(S): 10 TABLET, FILM COATED ORAL at 11:22

## 2021-03-16 PROCEDURE — 96366 THER/PROPH/DIAG IV INF ADDON: CPT

## 2021-03-16 PROCEDURE — 36415 COLL VENOUS BLD VENIPUNCTURE: CPT

## 2021-03-16 PROCEDURE — 85025 COMPLETE CBC W/AUTO DIFF WBC: CPT

## 2021-03-16 PROCEDURE — 87040 BLOOD CULTURE FOR BACTERIA: CPT

## 2021-03-16 PROCEDURE — 85730 THROMBOPLASTIN TIME PARTIAL: CPT

## 2021-03-16 PROCEDURE — U0003: CPT

## 2021-03-16 PROCEDURE — 96365 THER/PROPH/DIAG IV INF INIT: CPT

## 2021-03-16 PROCEDURE — 71045 X-RAY EXAM CHEST 1 VIEW: CPT

## 2021-03-16 PROCEDURE — 87186 SC STD MICRODIL/AGAR DIL: CPT

## 2021-03-16 PROCEDURE — 93005 ELECTROCARDIOGRAM TRACING: CPT

## 2021-03-16 PROCEDURE — 74176 CT ABD & PELVIS W/O CONTRAST: CPT

## 2021-03-16 PROCEDURE — 80048 BASIC METABOLIC PNL TOTAL CA: CPT

## 2021-03-16 PROCEDURE — 70450 CT HEAD/BRAIN W/O DYE: CPT

## 2021-03-16 PROCEDURE — 96375 TX/PRO/DX INJ NEW DRUG ADDON: CPT

## 2021-03-16 PROCEDURE — 87077 CULTURE AEROBIC IDENTIFY: CPT

## 2021-03-16 PROCEDURE — 82746 ASSAY OF FOLIC ACID SERUM: CPT

## 2021-03-16 PROCEDURE — 86769 SARS-COV-2 COVID-19 ANTIBODY: CPT

## 2021-03-16 PROCEDURE — 81001 URINALYSIS AUTO W/SCOPE: CPT

## 2021-03-16 PROCEDURE — 73502 X-RAY EXAM HIP UNI 2-3 VIEWS: CPT

## 2021-03-16 PROCEDURE — 83690 ASSAY OF LIPASE: CPT

## 2021-03-16 PROCEDURE — 87086 URINE CULTURE/COLONY COUNT: CPT

## 2021-03-16 PROCEDURE — 96376 TX/PRO/DX INJ SAME DRUG ADON: CPT

## 2021-03-16 PROCEDURE — 80164 ASSAY DIPROPYLACETIC ACD TOT: CPT

## 2021-03-16 PROCEDURE — 84439 ASSAY OF FREE THYROXINE: CPT

## 2021-03-16 PROCEDURE — 85027 COMPLETE CBC AUTOMATED: CPT

## 2021-03-16 PROCEDURE — 80053 COMPREHEN METABOLIC PANEL: CPT

## 2021-03-16 PROCEDURE — G0378: CPT

## 2021-03-16 PROCEDURE — 82607 VITAMIN B-12: CPT

## 2021-03-16 PROCEDURE — 99285 EMERGENCY DEPT VISIT HI MDM: CPT | Mod: 25

## 2021-03-16 PROCEDURE — 85610 PROTHROMBIN TIME: CPT

## 2021-03-16 PROCEDURE — 83605 ASSAY OF LACTIC ACID: CPT

## 2021-03-16 PROCEDURE — U0005: CPT

## 2021-03-16 PROCEDURE — 96372 THER/PROPH/DIAG INJ SC/IM: CPT | Mod: XU

## 2021-03-16 PROCEDURE — 82140 ASSAY OF AMMONIA: CPT

## 2021-03-16 PROCEDURE — 84443 ASSAY THYROID STIM HORMONE: CPT

## 2021-03-30 NOTE — PHYSICAL THERAPY INITIAL EVALUATION ADULT - PERSONAL SAFETY AND JUDGMENT, REHAB EVAL
well developed, well nourished , in no acute distress , ambulating without difficulty , normal communication ability impaired

## 2023-01-30 NOTE — DISCHARGE NOTE NURSING/CASE MANAGEMENT/SOCIAL WORK - PATIENT PORTAL LINK FT
You can access the FollowMyHealth Patient Portal offered by [facility] by registering at the following website: [email or web address]. By joining [email or web address]’s FollowMyHealth portal, you will also be able to view your health information using other applications (apps) compatible with our system. Implemented All Universal Safety Interventions:  [place] to call system. Call bell, personal items and telephone within reach. Instruct patient to call for assistance. Room bathroom lighting operational. Non-slip footwear when patient is off stretcher. Physically safe environment: no spills, clutter or unnecessary equipment. Stretcher in lowest position, wheels locked, appropriate side rails in place.

## 2023-03-02 NOTE — DISCHARGE NOTE NURSING/CASE MANAGEMENT/SOCIAL WORK - NURSING SECTION COMPLETE
Anesthesia Pre Eval Note  Pt with left quadriceps tendon rupture here for left quadriceps tendon repair  Anesthesia ROS/Med Hx    Overall Review:  EKG was reviewed     Anesthetic Complication History:  Patient does not have a history of anesthetic complications      Pulmonary Review:  Patient does not have a pulmonary history      Cardiovascular Review:    Positive for hypertension  Positive for hyperlipidemia    GI/HEPATIC/RENAL Review:  Patient does not have a GI/hepatic/renalhistory       End/Other Review:  Positive for diabetes - type 2  Positive for arthritis  Additional Results:     ALLERGIES:  No Known Allergies       Last Labs        Component                Value               Date/Time                  WBC                      9.2                 03/02/2023 0916            RBC                      4.05 (L)            03/02/2023 0916            HGB                      11.5 (L)            03/02/2023 0916            HCT                      35.5 (L)            03/02/2023 0916            MCV                      87.7                03/02/2023 0916            MCH                      28.4                03/02/2023 0916            MCHC                     32.4                03/02/2023 0916            RDW-CV                   14.7                03/02/2023 0916            Sodium                   138                 03/02/2023 0916            Potassium                4.9                 03/02/2023 0916            Chloride                 109                 03/02/2023 0916            Carbon Dioxide           23                  03/02/2023 0916            Glucose                  71                  03/02/2023 0916            BUN                      42 (H)              03/02/2023 0916            Creatinine               2.18 (H)            03/02/2023 0916            Glomerular Filtrati*     30 (L)              03/02/2023 0916            Calcium                  9.2                 03/02/2023 0916            PLT                       320                 03/02/2023 0916        Past Medical History:  No date: Arthritis  No date: Back pain      Comment:  tailbone  No date: Diabetes mellitus (CMD)  No date: Essential (primary) hypertension  No date: Hyperlipemia  No date: Neuropathy      Comment:  feet  No date: Quadriceps tendon rupture      Comment:  left    Past Surgical History:  No date: Esophagogastroduodenoscopy transoral flex diag  No date: Hernia repair; Bilateral      Comment:  inguinal  1962: Laceration repair; Left      Comment:  hand  No date: Open access colonoscopy       Prior to Admission medications :  Medication Vitamin D, Cholecalciferol, 50 mcg (2,000 units) capsule, Sig Take 50 mcg by mouth daily., Start Date , End Date , Taking? Yes, Authorizing Provider Outside Provider    Medication empagliflozin (JARDIANCE) 25 MG tablet, Sig Take 25 mg by mouth daily (before breakfast)., Start Date , End Date , Taking? Yes, Authorizing Provider Outside Provider    Medication glipiZIDE (GLUCOTROL) 10 MG tablet, Sig Take 10 mg by mouth 2 times daily., Start Date 7/14/21, End Date , Taking? Yes, Authorizing Provider Outside Provider    Medication losartan (COZAAR) 50 MG tablet, Sig Take 50 mg by mouth daily., Start Date 7/14/21, End Date , Taking? Yes, Authorizing Provider Outside Provider    Medication metFORMIN (GLUCOPHAGE-XR) 500 MG 24 hr tablet, Sig Take 500 mg by mouth in the morning and 500 mg in the evening., Start Date 7/14/21, End Date , Taking? Yes, Authorizing Provider Outside Provider    Medication fluticasone (FLONASE) 50 MCG/ACT nasal spray, Sig Spray 2 sprays in each nostril daily for 10 days.  Patient taking differently: Spray 2 sprays in each nostril daily as needed., Start Date 8/26/21, End Date 3/1/23, Taking? Yes, Authorizing Provider Carmencita Barakat, CNP    Medication cephalexin (KEFLEX) 500 MG capsule, Sig Take 500 mg by mouth every 6 hours. Rx for 7 days, Start Date , End Date , Taking? ,  Authorizing Provider Outside Provider    Medication HYDROcodone-acetaminophen (NORCO) 5-325 MG per tablet, Sig Take 1 tablet by mouth every 4 hours as needed for Pain., Start Date 2/21/23, End Date 3/2/23, Taking? , Authorizing Provider Melisa Bender, CNP    Medication metoPROLOL tartrate (LOPRESSOR) 25 MG tablet, Sig take 1 tablet (25 mg) by oral route 2 times per day, Start Date 7/14/21, End Date , Taking? , Authorizing Provider Outside Provider    Medication Accu-Chek Angle Plus test strip, Sig , Start Date 8/17/21, End Date , Taking? , Authorizing Provider Outside Provider         Patient Vitals in the past 24 hrs:  03/02/23 0835, BP:(!) 150/75, Temp:36.6 °C (97.9 °F), Temp src:Oral, Pulse:73, Resp:16, SpO2:99 %  03/02/23 0822, Height:5' 11.65\" (1.82 m), Weight:83.9 kg (185 lb)  03/01/23 1755, Height:6' 2.5\" (1.892 m), Weight:83.9 kg (185 lb)      Relevant Problems   No relevant active problems       Physical Exam     Airway   Mallampati: III  TMJ Mobility: Good    Cardiovascular  Cardiovascular exam normal  Cardio Rhythm: Regular  Cardio Rate: Normal    Head Assessment  Head assessment: Normocephalic and Atraumatic    General Assessment  General Assessment: Alert and oriented and No acute distress    Dental Exam    Patient has:  Upper Removable Equipment    Pulmonary Exam  Pulmonary exam normal  Breath sounds clear to auscultation:  Yes  Patient Demonstrates:  Non-labored Breathing      Anesthesia Plan:    ASA Status: 2  Anesthesia Type: General    Induction: Intravenous  Preferred Airway Type: ETT  Maintenance: Inhalational    Post-op Pain Management: Per Surgeon and Single Shot Block      Checklist  Reviewed: NPO Status, Allergies, Medications, Problem list, Past Med History, Patient Summary, Lab Results and EKG  Consent/Risks Discussed Statement:  The proposed anesthetic plan, including its risks and benefits, have been discussed with the Patient (Kamaljit at bedside) along with the risks and benefits of  alternatives. Questions were encouraged and answered and the patient and/or representative understands and agrees to proceed.        I discussed with the patient (and/or patient's legal representative) the risks and benefits of the proposed anesthesia plan, General, which may include services performed by other anesthesia providers.    Alternative anesthesia plans, if available, were reviewed with the patient (and/or patient's legal representative). Discussion has been held with the patient (and/or patient's legal representative) regarding risks of anesthesia, which include Nausea, Vomiting, Dental Injury, Hypotension, Sore Throat, Allergic Reaction, oral injury, bleeding/hematoma, nerve injury, infection and persistent pain and emergent situations that may require change in anesthesia plan.    The patient (and/or patient's legal representative) has indicated understanding, his/her questions have been answered, and he/she wishes to proceed with the planned anesthetic.    Blood Products: Not Anticipated    Comments  Plan Comments: Also, specifically discussed the risk of dental injury to lips, teeth, gums and oral pharynx that could occur during any oral manipulation with placement of ETT, LMA, OA or suctioning.  Pt would like to proceed with anesthesia after discussing these risks.     Patient/Caregiver provided printed discharge information.

## 2023-07-06 NOTE — PROGRESS NOTE ADULT - PROBLEM SELECTOR PLAN 6
Continue statin.
Arava Counseling:  Patient counseled regarding adverse effects of Arava including but not limited to nausea, vomiting, abnormalities in liver function tests. Patients may develop mouth sores, rash, diarrhea, and abnormalities in blood counts. The patient understands that monitoring is required including LFTs and blood counts.  There is a rare possibility of scarring of the liver and lung problems that can occur when taking methotrexate. Persistent nausea, loss of appetite, pale stools, dark urine, cough, and shortness of breath should be reported immediately. Patient advised to discontinue Arava treatment and consult with a physician prior to attempting conception. The patient will have to undergo a treatment to eliminate Arava from the body prior to conception.

## 2023-11-17 NOTE — ED PROVIDER NOTE - GASTROINTESTINAL, MLM
Abdomen soft, non-tender, no guarding. Otezla Pregnancy And Lactation Text: This medication is Pregnancy Category C and it isn't known if it is safe during pregnancy. It is unknown if it is excreted in breast milk.

## 2024-08-29 NOTE — ED CDU PROVIDER SUBSEQUENT DAY NOTE - SOCIAL CONCERNS
Addended by: MEJIA TALBOT on: 8/29/2024 01:39 PM     Modules accepted: Orders    
Complex psychosocial needs/coping issues